# Patient Record
Sex: FEMALE | Race: WHITE | NOT HISPANIC OR LATINO | Employment: OTHER | URBAN - METROPOLITAN AREA
[De-identification: names, ages, dates, MRNs, and addresses within clinical notes are randomized per-mention and may not be internally consistent; named-entity substitution may affect disease eponyms.]

---

## 2023-02-10 PROBLEM — M19.90 DJD (DEGENERATIVE JOINT DISEASE): Status: ACTIVE | Noted: 2023-02-10

## 2023-02-10 PROBLEM — M54.50 LOW BACK PAIN: Status: ACTIVE | Noted: 2023-02-10

## 2023-02-10 PROBLEM — G47.00 INSOMNIA: Status: ACTIVE | Noted: 2023-02-10

## 2023-02-10 PROBLEM — F17.200 NICOTINE DEPENDENCE: Status: ACTIVE | Noted: 2023-02-10

## 2023-02-10 PROBLEM — R93.89 ABNORMAL CT OF THE CHEST: Status: ACTIVE | Noted: 2023-02-10

## 2023-02-10 PROBLEM — M47.816 DJD (DEGENERATIVE JOINT DISEASE), LUMBAR: Status: ACTIVE | Noted: 2023-02-10

## 2023-02-10 PROBLEM — I65.29 CAROTID STENOSIS: Status: ACTIVE | Noted: 2023-02-10

## 2023-02-10 PROBLEM — F41.1 GAD (GENERALIZED ANXIETY DISORDER): Status: ACTIVE | Noted: 2023-02-10

## 2023-02-10 PROBLEM — M99.03 SOMATIC DYSFUNCTION OF LUMBOSACRAL REGION: Status: ACTIVE | Noted: 2023-02-10

## 2023-02-10 PROBLEM — J44.9 COPD (CHRONIC OBSTRUCTIVE PULMONARY DISEASE) (MULTI): Status: ACTIVE | Noted: 2023-02-10

## 2023-02-10 PROBLEM — I70.0 ATHEROSCLEROSIS OF AORTA (CMS-HCC): Status: ACTIVE | Noted: 2023-02-10

## 2023-02-10 PROBLEM — E55.9 VITAMIN D DEFICIENCY: Status: ACTIVE | Noted: 2023-02-10

## 2023-02-10 PROBLEM — R09.89 BRUIT OF RIGHT CAROTID ARTERY: Status: ACTIVE | Noted: 2023-02-10

## 2023-02-10 PROBLEM — M81.0 OSTEOPOROSIS, SENILE: Status: ACTIVE | Noted: 2023-02-10

## 2023-02-10 PROBLEM — E78.5 HYPERLIPIDEMIA: Status: ACTIVE | Noted: 2023-02-10

## 2023-02-10 PROBLEM — J44.1 COPD EXACERBATION (MULTI): Status: ACTIVE | Noted: 2023-02-10

## 2023-02-10 PROBLEM — I10 HYPERTENSION: Status: ACTIVE | Noted: 2023-02-10

## 2023-02-10 PROBLEM — R53.83 FATIGUE: Status: ACTIVE | Noted: 2023-02-10

## 2023-02-10 PROBLEM — J45.909 ASTHMATIC BRONCHITIS (HHS-HCC): Status: ACTIVE | Noted: 2023-02-10

## 2023-02-10 PROBLEM — S39.012A LUMBOSACRAL STRAIN: Status: ACTIVE | Noted: 2023-02-10

## 2023-02-10 RX ORDER — ALPRAZOLAM 0.5 MG/1
0.5 TABLET ORAL 2 TIMES DAILY PRN
COMMUNITY
Start: 2022-10-04 | End: 2023-03-23 | Stop reason: SDUPTHER

## 2023-02-10 RX ORDER — PHENOL 1.4 %
1 AEROSOL, SPRAY (ML) MUCOUS MEMBRANE DAILY
COMMUNITY
Start: 2017-10-23

## 2023-02-10 RX ORDER — ALBUTEROL SULFATE 90 UG/1
AEROSOL, METERED RESPIRATORY (INHALATION) EVERY 4 HOURS PRN
COMMUNITY
End: 2023-03-23 | Stop reason: ALTCHOICE

## 2023-02-10 RX ORDER — ATORVASTATIN CALCIUM 10 MG/1
1 TABLET, FILM COATED ORAL DAILY
COMMUNITY
Start: 2014-04-01 | End: 2023-06-26 | Stop reason: SDUPTHER

## 2023-02-10 RX ORDER — FERROUS SULFATE, DRIED 160(50) MG
2 TABLET, EXTENDED RELEASE ORAL DAILY
COMMUNITY

## 2023-02-10 RX ORDER — VALSARTAN AND HYDROCHLOROTHIAZIDE 160; 12.5 MG/1; MG/1
1 TABLET, FILM COATED ORAL DAILY
COMMUNITY
Start: 2013-12-10 | End: 2023-06-26 | Stop reason: SDUPTHER

## 2023-03-23 ENCOUNTER — OFFICE VISIT (OUTPATIENT)
Dept: PRIMARY CARE | Facility: CLINIC | Age: 73
End: 2023-03-23
Payer: MEDICARE

## 2023-03-23 VITALS
OXYGEN SATURATION: 96 % | DIASTOLIC BLOOD PRESSURE: 79 MMHG | WEIGHT: 119 LBS | SYSTOLIC BLOOD PRESSURE: 143 MMHG | HEIGHT: 62 IN | BODY MASS INDEX: 21.9 KG/M2 | HEART RATE: 80 BPM

## 2023-03-23 DIAGNOSIS — F41.1 GAD (GENERALIZED ANXIETY DISORDER): Primary | ICD-10-CM

## 2023-03-23 DIAGNOSIS — J43.9 PULMONARY EMPHYSEMA, UNSPECIFIED EMPHYSEMA TYPE (MULTI): ICD-10-CM

## 2023-03-23 DIAGNOSIS — J44.1 COPD EXACERBATION (MULTI): ICD-10-CM

## 2023-03-23 DIAGNOSIS — I70.0 ATHEROSCLEROSIS OF AORTA (CMS-HCC): ICD-10-CM

## 2023-03-23 PROCEDURE — 99213 OFFICE O/P EST LOW 20 MIN: CPT | Performed by: FAMILY MEDICINE

## 2023-03-23 PROCEDURE — 3078F DIAST BP <80 MM HG: CPT | Performed by: FAMILY MEDICINE

## 2023-03-23 PROCEDURE — 1159F MED LIST DOCD IN RCRD: CPT | Performed by: FAMILY MEDICINE

## 2023-03-23 PROCEDURE — 3077F SYST BP >= 140 MM HG: CPT | Performed by: FAMILY MEDICINE

## 2023-03-23 RX ORDER — ALPRAZOLAM 0.5 MG/1
0.5 TABLET ORAL 2 TIMES DAILY PRN
Qty: 40 TABLET | Refills: 2 | Status: SHIPPED | OUTPATIENT
Start: 2023-03-23 | End: 2023-06-26 | Stop reason: SDUPTHER

## 2023-03-23 ASSESSMENT — PATIENT HEALTH QUESTIONNAIRE - PHQ9
SUM OF ALL RESPONSES TO PHQ9 QUESTIONS 1 & 2: 0
2. FEELING DOWN, DEPRESSED OR HOPELESS: NOT AT ALL
1. LITTLE INTEREST OR PLEASURE IN DOING THINGS: NOT AT ALL

## 2023-03-23 NOTE — ASSESSMENT & PLAN NOTE
Tobacco use: Patient encouraged to stop smoking.  The patient is aware of the detrimental effects of long-term smoking on overall health and life expectancy.  Call 1800-QUIT-NOW for additional stop smoking counselling free of charge.    Over the past several years, I have counseled the patient about smoking/tobacco cessation and how I can support efforts when patient is ready to quit.  I have discussed nicotine replacement therapy, Chantix, Wellbutrin, hypnosis, support groups and acupuncture as potential options.  Patient currently has no signs or symptoms of tobacco related disease.  If interested in hypnotism, ask for referral to [facility] or call [facility] in [place] at [phone]

## 2023-03-23 NOTE — PROGRESS NOTES
HPI 72 y.o. female presents for evaluation and refill of controlled substance.  She uses Xanax once per day and occasionally twice.  Her last dose was yesterday.  Anxiety is well controlled.  She is sleeping well.  She denies adverse effects.    Continues to smoke cigarettes.  She has diagnosis of COPD and has been counseled on stopping smoking.    Past Medical History:   Diagnosis Date    Basal cell carcinoma of skin, unspecified     Skin cancer, basal cell    Other conditions influencing health status     Normal cardiac stress test    Personal history of diseases of the skin and subcutaneous tissue 09/15/2014    History of rosacea    Personal history of other diseases of the musculoskeletal system and connective tissue     History of osteoarthritis    Personal history of other medical treatment     H/O bone density study    Personal history of other medical treatment     History of mammogram    Personal history of other medical treatment     History of Papanicolaou smear of cervix    Pneumonia, unspecified organism 10/15/2018    Pneumonia, primary atypical      Past Surgical History:   Procedure Laterality Date    APPENDECTOMY  09/15/2014    Appendectomy     SECTION, CLASSIC  09/15/2014     Section    COLONOSCOPY  2016    Complete Colonoscopy    CT HEAD ANGIO W AND WO IV CONTRAST  3/7/2022    CT HEAD ANGIO W AND WO IV CONTRAST 3/7/2022 [facility]    CT NECK ANGIO W AND WO IV CONTRAST  3/7/2022    CT NECK ANGIO W AND WO IV CONTRAST 3/7/2022 [facility]    OTHER SURGICAL HISTORY  09/15/2014    Endarterectomy Common Carotid    OTHER SURGICAL HISTORY  2018    Carotid thromboendarterectomy    OTHER SURGICAL HISTORY  2017    Mohs Micrographic Surgery Nose     Family History   Problem Relation Name Age of Onset    Other (worker's pneumoconiosis) Father          's    Fibromyalgia Sister        Social History     Socioeconomic History    Marital status:  "     Spouse name: Not on file    Number of children: Not on file    Years of education: Not on file    Highest education level: Not on file   Occupational History    Not on file   Tobacco Use    Smoking status: Not on file    Smokeless tobacco: Not on file   Vaping Use    Vaping status: Not on file   Substance and Sexual Activity    Alcohol use: Not on file    Drug use: Not on file    Sexual activity: Not on file   Other Topics Concern    Not on file   Social History Narrative    Not on file     Social Determinants of Health     Financial Resource Strain: Not on file   Food Insecurity: Not on file   Transportation Needs: Not on file   Physical Activity: Not on file   Stress: Not on file   Social Connections: Not on file   Intimate Partner Violence: Not on file   Housing Stability: Not on file       Current Outpatient Medications on File Prior to Visit   Medication Sig Dispense Refill    ALPRAZolam (Xanax) 0.5 mg tablet Take 1 tablet (0.5 mg) by mouth 2 times a day as needed for anxiety.      atorvastatin (Lipitor) 10 mg tablet Take 1 tablet (10 mg) by mouth once daily.      calcium carbonate-vitamin D3 500 mg-5 mcg (200 unit) tablet Take 2 tablets by mouth once daily.      multivitamin-min-iron-FA-vit K (Adults Multivitamin) 18 mg iron-400 mcg-25 mcg tablet Take 1 tablet by mouth 1 (one) time each day.      valsartan-hydrochlorothiazide (Diovan-HCT) 160-12.5 mg tablet Take 1 tablet by mouth once daily.      [DISCONTINUED] albuterol 90 mcg/actuation inhaler Inhale every 4 (four) hours if needed for wheezing (cough).       No current facility-administered medications on file prior to visit.       No Known Allergies    Visit Vitals  /79   Pulse 80   Ht 1.575 m (5' 2\")   Wt 54 kg (119 lb)   SpO2 96%   BMI 21.77 kg/m²   Smoking Status Never Assessed   BSA 1.54 m²        EXAM:  Alert and oriented ×3.  No acute distress.  No tremors noted.  Gait is normal.  Mood and affect are normal. "     Assessment/Diagnosis  1. KB (generalized anxiety disorder)  Patient is aware of Dr. Broderick's and Ashlyn Hazel's practice rules for use of scheduled medication.  Has a signed contract stating that patient will only receive controlled substance prescriptions from Dr. Broderick, will only receive a one month supply, will fill prescriptions at one pharmacy, and agrees to a random urine drug screen.  Patient is aware that she must have an office appointment every 90 days to continue to receive benzodiazepines or narcotics.    - ALPRAZolam (Xanax) 0.5 mg tablet; Take 1 tablet (0.5 mg) by mouth 2 times a day as needed for anxiety.  Dispense: 40 tablet; Refill: 2    4. Pulmonary emphysema, unspecified emphysema type (CMS/[facility])  Counseled on stopping smoking in the past.        Plan    [place] reviewed.  Controlled Substance Agreement is current.  Urine drug screen up to date.    CONTROLLED SUBSTANCE USE:   Patient is aware of Dr. Broderick's and Ashlyn Hazel's practice rules for use of scheduled medication.  Has a signed contract stating that patient will only receive controlled substance prescriptions from Dr. Broderick, will only receive a one month supply, will fill prescriptions at one pharmacy, and agrees to a random urine drug screen.  Patient is aware that she must have an office appointment every 90 days to continue to receive benzodiazepines or narcotics.        Follow up in 3 months for annual comprehensive medical evaluation and Medicare wellness visit    I will continue to monitor, evaluate, assess and treat all problems/diagnoses as appropriate and continue to collaborate with specialists.    Contact office or send a [employer] message with any questions or concerns    Patient will only be notified of labs that require medical intervention.    Prescriptions will not be filled unless you are compliant with your follow up appointments or have a follow up appointment scheduled as per instruction of your physician. Refills  should be requested at the time of your visit.    **Charting was completed using voice recognition technology and may include unintended errors**    Caio Broderick DO, FACOFP  Senior Attending Physician  [facility]  [address], [address]  [place] [address] [phone]

## 2023-06-20 ENCOUNTER — TELEPHONE (OUTPATIENT)
Dept: PRIMARY CARE | Facility: CLINIC | Age: 73
End: 2023-06-20
Payer: COMMERCIAL

## 2023-06-22 ENCOUNTER — LAB (OUTPATIENT)
Dept: LAB | Facility: LAB | Age: 73
End: 2023-06-22
Payer: MEDICARE

## 2023-06-22 DIAGNOSIS — R53.83 FATIGUE, UNSPECIFIED TYPE: ICD-10-CM

## 2023-06-22 DIAGNOSIS — E55.9 VITAMIN D DEFICIENCY: ICD-10-CM

## 2023-06-22 DIAGNOSIS — E78.5 HYPERLIPIDEMIA, UNSPECIFIED HYPERLIPIDEMIA TYPE: ICD-10-CM

## 2023-06-22 DIAGNOSIS — I10 HYPERTENSION, UNSPECIFIED TYPE: ICD-10-CM

## 2023-06-22 DIAGNOSIS — Z00.00 ENCOUNTER FOR HEALTH MAINTENANCE EXAMINATION: ICD-10-CM

## 2023-06-22 DIAGNOSIS — Z51.81 ENCOUNTER FOR THERAPEUTIC DRUG LEVEL MONITORING: ICD-10-CM

## 2023-06-22 LAB
ALANINE AMINOTRANSFERASE (SGPT) (U/L) IN SER/PLAS: 13 U/L (ref 7–45)
ALBUMIN (G/DL) IN SER/PLAS: 4.1 G/DL (ref 3.4–5)
ALKALINE PHOSPHATASE (U/L) IN SER/PLAS: 61 U/L (ref 33–136)
ANION GAP IN SER/PLAS: 13 MMOL/L (ref 10–20)
APPEARANCE, URINE: ABNORMAL
ASPARTATE AMINOTRANSFERASE (SGOT) (U/L) IN SER/PLAS: 27 U/L (ref 9–39)
BILIRUBIN TOTAL (MG/DL) IN SER/PLAS: 0.8 MG/DL (ref 0–1.2)
BILIRUBIN, URINE: NEGATIVE
BLOOD, URINE: NEGATIVE
CALCIUM (MG/DL) IN SER/PLAS: 9.5 MG/DL (ref 8.6–10.3)
CARBON DIOXIDE, TOTAL (MMOL/L) IN SER/PLAS: 25 MMOL/L (ref 21–32)
CHLORIDE (MMOL/L) IN SER/PLAS: 100 MMOL/L (ref 98–107)
CHOLESTEROL (MG/DL) IN SER/PLAS: 135 MG/DL (ref 0–199)
CHOLESTEROL IN HDL (MG/DL) IN SER/PLAS: 34.8 MG/DL
CHOLESTEROL/HDL RATIO: 3.9
COLOR, URINE: YELLOW
CREATININE (MG/DL) IN SER/PLAS: 0.6 MG/DL (ref 0.5–1.05)
ERYTHROCYTE DISTRIBUTION WIDTH (RATIO) BY AUTOMATED COUNT: 11.9 % (ref 11.5–14.5)
ERYTHROCYTE MEAN CORPUSCULAR HEMOGLOBIN CONCENTRATION (G/DL) BY AUTOMATED: 33.7 G/DL (ref 32–36)
ERYTHROCYTE MEAN CORPUSCULAR VOLUME (FL) BY AUTOMATED COUNT: 112 FL (ref 80–100)
ERYTHROCYTES (10*6/UL) IN BLOOD BY AUTOMATED COUNT: 3.96 X10E12/L (ref 4–5.2)
GFR FEMALE: >90 ML/MIN/1.73M2
GLUCOSE (MG/DL) IN SER/PLAS: 92 MG/DL (ref 74–99)
GLUCOSE, URINE: NEGATIVE MG/DL
HEMATOCRIT (%) IN BLOOD BY AUTOMATED COUNT: 44.5 % (ref 36–46)
HEMOGLOBIN (G/DL) IN BLOOD: 15 G/DL (ref 12–16)
KETONES, URINE: NEGATIVE MG/DL
LDL: 82 MG/DL (ref 0–99)
LEUKOCYTE ESTERASE, URINE: NEGATIVE
LEUKOCYTES (10*3/UL) IN BLOOD BY AUTOMATED COUNT: 7.3 X10E9/L (ref 4.4–11.3)
NITRITE, URINE: NEGATIVE
PH, URINE: 7 (ref 5–8)
PLATELETS (10*3/UL) IN BLOOD AUTOMATED COUNT: 146 X10E9/L (ref 150–450)
POTASSIUM (MMOL/L) IN SER/PLAS: 3.7 MMOL/L (ref 3.5–5.3)
PROTEIN TOTAL: 6.8 G/DL (ref 6.4–8.2)
PROTEIN, URINE: NEGATIVE MG/DL
RBC MORPHOLOGY IN BLOOD: NORMAL
SODIUM (MMOL/L) IN SER/PLAS: 134 MMOL/L (ref 136–145)
SPECIFIC GRAVITY, URINE: 1.01 (ref 1–1.03)
SPHEROCYTES PRESENCE IN BLOOD BY LIGHT MICROSCOPY: NORMAL
THYROTROPIN (MIU/L) IN SER/PLAS BY DETECTION LIMIT <= 0.05 MIU/L: 0.57 MIU/L (ref 0.44–3.98)
TRIGLYCERIDE (MG/DL) IN SER/PLAS: 93 MG/DL (ref 0–149)
UREA NITROGEN (MG/DL) IN SER/PLAS: 19 MG/DL (ref 6–23)
UROBILINOGEN, URINE: 2 MG/DL (ref 0–1.9)
VLDL: 19 MG/DL (ref 0–40)

## 2023-06-22 PROCEDURE — 36415 COLL VENOUS BLD VENIPUNCTURE: CPT

## 2023-06-22 PROCEDURE — 84443 ASSAY THYROID STIM HORMONE: CPT

## 2023-06-22 PROCEDURE — 82652 VIT D 1 25-DIHYDROXY: CPT

## 2023-06-22 PROCEDURE — 80358 DRUG SCREENING METHADONE: CPT

## 2023-06-22 PROCEDURE — 80053 COMPREHEN METABOLIC PANEL: CPT

## 2023-06-22 PROCEDURE — 80361 OPIATES 1 OR MORE: CPT

## 2023-06-22 PROCEDURE — 80368 SEDATIVE HYPNOTICS: CPT

## 2023-06-22 PROCEDURE — 80373 DRUG SCREENING TRAMADOL: CPT

## 2023-06-22 PROCEDURE — 80307 DRUG TEST PRSMV CHEM ANLYZR: CPT

## 2023-06-22 PROCEDURE — 85027 COMPLETE CBC AUTOMATED: CPT

## 2023-06-22 PROCEDURE — 80365 DRUG SCREENING OXYCODONE: CPT

## 2023-06-22 PROCEDURE — 80061 LIPID PANEL: CPT

## 2023-06-22 PROCEDURE — 85060 BLOOD SMEAR INTERPRETATION: CPT | Performed by: PATHOLOGY

## 2023-06-22 PROCEDURE — 81003 URINALYSIS AUTO W/O SCOPE: CPT

## 2023-06-22 PROCEDURE — 80354 DRUG SCREENING FENTANYL: CPT

## 2023-06-22 PROCEDURE — 80346 BENZODIAZEPINES1-12: CPT

## 2023-06-23 LAB — CBC DIFFERENTIAL PATH REVIEW: NORMAL

## 2023-06-25 PROBLEM — L57.0 ACTINIC KERATOSES: Status: ACTIVE | Noted: 2023-06-25

## 2023-06-25 PROBLEM — C44.621 SQUAMOUS CELL CARCINOMA, ARM: Status: ACTIVE | Noted: 2023-06-25

## 2023-06-25 PROBLEM — L72.0 MILIUM: Status: ACTIVE | Noted: 2023-06-25

## 2023-06-25 PROBLEM — L71.9 ROSACEA: Status: ACTIVE | Noted: 2023-06-25

## 2023-06-25 PROBLEM — L82.1 SEBORRHEIC KERATOSES: Status: ACTIVE | Noted: 2023-06-25

## 2023-06-25 PROBLEM — D18.01 CHERRY HEMANGIOMA: Status: ACTIVE | Noted: 2023-06-25

## 2023-06-25 PROBLEM — C44.321 SQUAMOUS CELL CARCINOMA OF NOSE: Status: ACTIVE | Noted: 2023-06-25

## 2023-06-25 PROBLEM — L85.9: Status: ACTIVE | Noted: 2023-06-25

## 2023-06-25 PROBLEM — L81.4 SOLAR LENTIGO: Status: ACTIVE | Noted: 2023-06-25

## 2023-06-25 RX ORDER — ALBUTEROL SULFATE 90 UG/1
2 AEROSOL, METERED RESPIRATORY (INHALATION) EVERY 4 HOURS PRN
COMMUNITY
Start: 2022-12-28 | End: 2023-06-26 | Stop reason: ALTCHOICE

## 2023-06-26 ENCOUNTER — OFFICE VISIT (OUTPATIENT)
Dept: PRIMARY CARE | Facility: CLINIC | Age: 73
End: 2023-06-26
Payer: MEDICARE

## 2023-06-26 VITALS
WEIGHT: 114 LBS | HEART RATE: 81 BPM | DIASTOLIC BLOOD PRESSURE: 64 MMHG | SYSTOLIC BLOOD PRESSURE: 128 MMHG | BODY MASS INDEX: 20.2 KG/M2 | HEIGHT: 63 IN | OXYGEN SATURATION: 95 %

## 2023-06-26 DIAGNOSIS — F17.218 CIGARETTE NICOTINE DEPENDENCE WITH OTHER NICOTINE-INDUCED DISORDER: ICD-10-CM

## 2023-06-26 DIAGNOSIS — Z51.81 ENCOUNTER FOR THERAPEUTIC DRUG LEVEL MONITORING: ICD-10-CM

## 2023-06-26 DIAGNOSIS — F41.1 GAD (GENERALIZED ANXIETY DISORDER): ICD-10-CM

## 2023-06-26 DIAGNOSIS — I10 HYPERTENSION, UNSPECIFIED TYPE: ICD-10-CM

## 2023-06-26 DIAGNOSIS — E78.5 HYPERLIPIDEMIA, UNSPECIFIED HYPERLIPIDEMIA TYPE: ICD-10-CM

## 2023-06-26 DIAGNOSIS — M19.90 OSTEOARTHRITIS, UNSPECIFIED OSTEOARTHRITIS TYPE, UNSPECIFIED SITE: ICD-10-CM

## 2023-06-26 DIAGNOSIS — I70.0 ATHEROSCLEROSIS OF AORTA (CMS-HCC): ICD-10-CM

## 2023-06-26 DIAGNOSIS — Z00.00 MEDICARE ANNUAL WELLNESS VISIT, SUBSEQUENT: ICD-10-CM

## 2023-06-26 DIAGNOSIS — J43.9 PULMONARY EMPHYSEMA, UNSPECIFIED EMPHYSEMA TYPE (MULTI): ICD-10-CM

## 2023-06-26 DIAGNOSIS — E55.9 VITAMIN D DEFICIENCY: ICD-10-CM

## 2023-06-26 DIAGNOSIS — Z00.00 ENCOUNTER FOR HEALTH MAINTENANCE EXAMINATION: Primary | ICD-10-CM

## 2023-06-26 DIAGNOSIS — R53.83 FATIGUE, UNSPECIFIED TYPE: ICD-10-CM

## 2023-06-26 DIAGNOSIS — M81.0 AGE-RELATED OSTEOPOROSIS WITHOUT CURRENT PATHOLOGICAL FRACTURE: ICD-10-CM

## 2023-06-26 PROBLEM — C44.621 SQUAMOUS CELL CARCINOMA, ARM: Status: RESOLVED | Noted: 2023-06-25 | Resolved: 2023-06-26

## 2023-06-26 PROBLEM — L57.0 ACTINIC KERATOSES: Status: RESOLVED | Noted: 2023-06-25 | Resolved: 2023-06-26

## 2023-06-26 LAB — VITAMIN D 1,25-DIHYDROXY: 69.1 PG/ML (ref 19.9–79.3)

## 2023-06-26 PROCEDURE — G0446 INTENS BEHAVE THER CARDIO DX: HCPCS | Performed by: FAMILY MEDICINE

## 2023-06-26 PROCEDURE — 1159F MED LIST DOCD IN RCRD: CPT | Performed by: FAMILY MEDICINE

## 2023-06-26 PROCEDURE — 99497 ADVNCD CARE PLAN 30 MIN: CPT | Performed by: FAMILY MEDICINE

## 2023-06-26 PROCEDURE — 3078F DIAST BP <80 MM HG: CPT | Performed by: FAMILY MEDICINE

## 2023-06-26 PROCEDURE — 1170F FXNL STATUS ASSESSED: CPT | Performed by: FAMILY MEDICINE

## 2023-06-26 PROCEDURE — 1160F RVW MEDS BY RX/DR IN RCRD: CPT | Performed by: FAMILY MEDICINE

## 2023-06-26 PROCEDURE — 99397 PER PM REEVAL EST PAT 65+ YR: CPT | Performed by: FAMILY MEDICINE

## 2023-06-26 PROCEDURE — 93000 ELECTROCARDIOGRAM COMPLETE: CPT | Performed by: FAMILY MEDICINE

## 2023-06-26 PROCEDURE — 3074F SYST BP LT 130 MM HG: CPT | Performed by: FAMILY MEDICINE

## 2023-06-26 PROCEDURE — G0439 PPPS, SUBSEQ VISIT: HCPCS | Performed by: FAMILY MEDICINE

## 2023-06-26 RX ORDER — VALSARTAN AND HYDROCHLOROTHIAZIDE 160; 12.5 MG/1; MG/1
1 TABLET, FILM COATED ORAL DAILY
Qty: 90 TABLET | Refills: 2 | Status: SHIPPED | OUTPATIENT
Start: 2023-06-26 | End: 2023-12-18 | Stop reason: SDUPTHER

## 2023-06-26 RX ORDER — ALPRAZOLAM 0.5 MG/1
0.5 TABLET ORAL 2 TIMES DAILY PRN
Qty: 40 TABLET | Refills: 2 | Status: SHIPPED | OUTPATIENT
Start: 2023-06-26 | End: 2023-10-06 | Stop reason: SDUPTHER

## 2023-06-26 RX ORDER — ATORVASTATIN CALCIUM 10 MG/1
10 TABLET, FILM COATED ORAL DAILY
Qty: 90 TABLET | Refills: 2 | Status: SHIPPED | OUTPATIENT
Start: 2023-06-26 | End: 2023-12-18 | Stop reason: SDUPTHER

## 2023-06-26 ASSESSMENT — ACTIVITIES OF DAILY LIVING (ADL)
BATHING: INDEPENDENT
DOING_HOUSEWORK: INDEPENDENT
DRESSING: INDEPENDENT
MANAGING_FINANCES: INDEPENDENT
GROCERY_SHOPPING: INDEPENDENT
TAKING_MEDICATION: INDEPENDENT

## 2023-06-26 NOTE — PATIENT INSTRUCTIONS
Handrails and grab bars  Daily walk for 30 mins every day    Multiple vitamin/mineral WITH iron once daily

## 2023-06-26 NOTE — PROGRESS NOTES
Annual Comprehensive Medical Exam    73 y.o. female presents for annual comprehensive medical evaluation, annual Medicare wellness visit and preventive services screening.  No recent hospitalizations, surgeries or significant injuries.    HPI    Right carotid bruit eval'd by Dr. Easton, [facility], with cartotid duplex in 23.  Follow up in 2023    Anxiety is managed with once daily Xanax.  Her last dose was last night.  Rarely she requires a second dose during the day.  40 tablets last 30+ days.    Compliant with Diovan HCTZ and Lipitor.  Also takes multiple vitamin with mineral and the calcium/vitamin D supplement.  She is active in the yard but does not exercise.    Bilateral mammogram in 2023.  Lung cancer screening with low-dose CT lung screen in 2023  Colonoscopy in   Osteoporosis evaluation with DEXA scan in  shows advanced osteopenia.  Immunizations are up-to-date for pneumococcal, shingles, COVID and influenza.    Past Medical History:   Diagnosis Date    Basal cell carcinoma of skin, unspecified     Skin cancer, basal cell    Other conditions influencing health status     Normal cardiac stress test    Personal history of diseases of the skin and subcutaneous tissue 09/15/2014    History of rosacea    Personal history of other diseases of the musculoskeletal system and connective tissue     History of osteoarthritis    Personal history of other medical treatment     H/O bone density study    Personal history of other medical treatment     History of mammogram    Personal history of other medical treatment     History of Papanicolaou smear of cervix    Pneumonia, unspecified organism 10/15/2018    Pneumonia, primary atypical      Past Surgical History:   Procedure Laterality Date    APPENDECTOMY  09/15/2014    Appendectomy     SECTION, CLASSIC  09/15/2014     Section    COLONOSCOPY  2016    Complete Colonoscopy    CT HEAD ANGIO W AND WO IV CONTRAST   3/7/2022    CT HEAD ANGIO W AND WO IV CONTRAST 3/7/2022 [facility]    CT NECK ANGIO W AND WO IV CONTRAST  3/7/2022    CT NECK ANGIO W AND WO IV CONTRAST 3/7/2022 [facility]    OTHER SURGICAL HISTORY  09/15/2014    Endarterectomy Common Carotid    OTHER SURGICAL HISTORY  11/08/2018    Carotid thromboendarterectomy    OTHER SURGICAL HISTORY  04/17/2017    Mohs Micrographic Surgery Nose     Family History   Problem Relation Name Age of Onset    Other (worker's pneumoconiosis) Father          's    Fibromyalgia Sister        Social History     Socioeconomic History    Marital status:      Spouse name: Not on file    Number of children: Not on file    Years of education: Not on file    Highest education level: Not on file   Occupational History    Not on file   Tobacco Use    Smoking status: Every Day     Packs/day: 1.00     Years: 55.00     Total pack years: 55.00     Types: Cigarettes    Smokeless tobacco: Never   Substance and Sexual Activity    Alcohol use: Not Currently    Drug use: Never    Sexual activity: Not on file   Other Topics Concern    Not on file   Social History Narrative    Not on file     Social Determinants of Health     Financial Resource Strain: Not on file   Food Insecurity: Not on file   Transportation Needs: Not on file   Physical Activity: Not on file   Stress: Not on file   Social Connections: Not on file   Intimate Partner Violence: Not on file   Housing Stability: Not on file       Current Outpatient Medications on File Prior to Visit   Medication Sig Dispense Refill    ALPRAZolam (Xanax) 0.5 mg tablet Take 1 tablet (0.5 mg) by mouth 2 times a day as needed for anxiety. 40 tablet 2    atorvastatin (Lipitor) 10 mg tablet Take 1 tablet (10 mg) by mouth once daily.      calcium carbonate-vitamin D3 500 mg-5 mcg (200 unit) tablet Take 2 tablets by mouth once daily.      multivitamin-min-iron-FA-vit K (Adults Multivitamin) 18 mg iron-400 mcg-25 mcg tablet Take  "1 tablet by mouth 1 (one) time each day.      valsartan-hydrochlorothiazide (Diovan-HCT) 160-12.5 mg tablet Take 1 tablet by mouth once daily.      [DISCONTINUED] albuterol 90 mcg/actuation inhaler Inhale 2 puffs every 4 hours if needed for shortness of breath or wheezing.       No current facility-administered medications on file prior to visit.       No Known Allergies    Complete review of systems is negative today except for that mentioned in the history of present illness.  In particular patient denies chest pain, shortness of breath, headaches and GI disturbances.      Visit Vitals  /64   Pulse 81   Ht 1.588 m (5' 2.5\")   Wt 51.7 kg (114 lb)   SpO2 95%   BMI 20.52 kg/m²   Smoking Status Every Day   BSA 1.51 m²      Physical Exam  Gen.: Alert and oriented ×3 female in no acute distress.  HEENT: Head is normocephalic.  Pupils equal and reactive to light.  Tympanic membranes are clear.  Pharynx is clear.  Neck is supple without adenopathy.  Significant right carotid bruit.  No masses or thyromegaly  Heart: Regular rate and rhythm without murmurs.  Lungs: Clear to auscultation bilaterally.  Breasts: deferred to GYN at pt request.  Pelvic: Deferred to GYN at pt request.  Abdomen: Soft with normal bowel sounds.  No masses or pain to palpation.  No bruits auscultated.  Extremities: Good range of motion of all joints.  No significant edema. Pedal pulses +1/4  Skin: No significant or irregular nevi visualized.  Neuro: No signs of focal neurologic deficit.  No tremor.  Speech and hearing are normal.  DTRs +3/4;  Muscle Strength +5/5.  Musculoskeletal: Spine with good ROM.  No scoliosis.  Leg lengths are equal.  Psych: normal affect.  No suicidal ideation.  Good judgement and insight.     The 10-year ASCVD risk score (Juani DK, et al., 2019) is: 28.3%    Values used to calculate the score:      Age: 73 years      Sex: Female      Is Non- : No      Diabetic: No      Tobacco smoker: Yes      " Systolic Blood Pressure: 128 mmHg      Is BP treated: Yes      HDL Cholesterol: 34.8 mg/dL      Total Cholesterol: 135 mg/dL      Lab reviewed in detail with patient      Diagnosis/Plan    1. Encounter for health maintenance examination  - Comprehensive Metabolic Panel; Future  - CBC; Future  - Lipid Panel; Future  - TSH with reflex to Free T4 if abnormal; Future  - Vitamin D 1,25 Dihydroxy; Future  - Urinalysis with Reflex Microscopic; Future  - ECG 12 lead (Clinic Performed)    2. Medicare annual wellness visit, subsequent  Living Will / Advanced Care Planning:  Discussed advance care planning including explanation and discussion of advanced directives.  Patient does have current up-to-date documents.       3. Hyperlipidemia, unspecified hyperlipidemia type  Hyperlipidemia:  Patient to continue medication.  Emphasize diet and exercise.  Explained importance of risk factor modification.  Emphasized importance of compliance to decrease risk for heart attack, stroke and peripheral artery disease.  - Lipid Panel; Future  - atorvastatin (Lipitor) 10 mg tablet; Take 1 tablet (10 mg) by mouth once daily.  Dispense: 90 tablet; Refill: 2    4. Hypertension, unspecified type  Hypertension: Discussed importance of good blood pressure control to avoid long-term complications such as heart attack and stroke.  Patient is aware that blood pressure goal is less than 130/80.  Maintaining a regular exercise program and body mass index (BMI) less than 25 as well as a diet lower in carbohydrates will help reach these goals.  - Comprehensive Metabolic Panel; Future  - ECG 12 lead (Clinic Performed)  - valsartan-hydrochlorothiazide (Diovan-HCT) 160-12.5 mg tablet; Take 1 tablet by mouth once daily.  Dispense: 90 tablet; Refill: 2    5. Vitamin D deficiency  - Vitamin D 1,25 Dihydroxy; Future    6. Age-related osteoporosis without current pathological fracture  - XR DEXA bone density; Future    7. Pulmonary emphysema, unspecified  emphysema type (CMS/HCC)  Remains essentially asymptomatic.  Encourage patient to begin a walking program for heart and lung benefit.    8. Atherosclerosis of aorta (CMS/HCC)  Follow-up with vascular surgeon in September 9. Osteoarthritis, unspecified osteoarthritis type, unspecified site    10. KB (generalized anxiety disorder)  Patient is aware of Dr. Broderick's and Ashlyn Hazel's practice rules for use of scheduled medication.  Has a signed contract stating that patient will only receive controlled substance prescriptions from Dr. Broderick, will only receive a one month supply, will fill prescriptions at one pharmacy, and agrees to a random urine drug screen.  Patient is aware that she must have an office appointment every 90 days to continue to receive benzodiazepines or narcotics.  - ALPRAZolam (Xanax) 0.5 mg tablet; Take 1 tablet (0.5 mg) by mouth 2 times a day as needed for anxiety.  Dispense: 40 tablet; Refill: 2    11. Cigarette nicotine dependence with other nicotine-induced disorder  Tobacco use: Patient encouraged to stop smoking.  The patient is aware of the detrimental effects of long-term smoking on overall health and life expectancy.  Call 6-800QUIT-NOW for additional stop smoking counselling free of charge.    Over the past several years, I have counseled the patient about smoking/tobacco cessation and how I can support efforts when patient is ready to quit.  I have discussed nicotine replacement therapy, Chantix, Wellbutrin, hypnosis, support groups and acupuncture as potential options.  Patient currently has no signs or symptoms of tobacco related disease.  If interested in hypnotism, ask for referral to [facility] or call [facility] in [place] at [phone]    12. Encounter for therapeutic drug level monitoring  - Opiate/Opioid/Benzo Extended Prescription Compliance; Future        Follow up in 6 months for medical management    I will continue to monitor,  evaluate, assess and treat all problems/diagnoses as appropriate and continue to collaborate with specialists.    Contact office or send a  MY Chart message with any questions or concerns    Encouraged to sign up with my  My Chart  Patient will only be notified of labs that require medical intervention.    Prescriptions will not be filled unless you are compliant with your follow up appointments or have a follow up appointment scheduled as per instruction of your physician. Refills should be requested at the time of your visit.    **Charting was completed using voice recognition technology and may include unintended errors**    Caio Broderick DO, FACOFP  [address]  [place] [address] [phone]

## 2023-06-28 LAB
6-ACETYLMORPHINE: <25 NG/ML
7-AMINOCLONAZEPAM: <25 NG/ML
ALPHA-HYDROXYALPRAZOLAM: 116 NG/ML
ALPHA-HYDROXYMIDAZOLAM: <25 NG/ML
ALPRAZOLAM: 46 NG/ML
AMPHETAMINE (PRESENCE) IN URINE BY SCREEN METHOD: ABNORMAL
BARBITURATES PRESENCE IN URINE BY SCREEN METHOD: ABNORMAL
CANNABINOIDS IN URINE BY SCREEN METHOD: ABNORMAL
CHLORDIAZEPOXIDE: <25 NG/ML
CLONAZEPAM: <25 NG/ML
COCAINE (PRESENCE) IN URINE BY SCREEN METHOD: ABNORMAL
CODEINE: <50 NG/ML
CREATINE, URINE FOR DRUG: 67.8 MG/DL
DIAZEPAM: <25 NG/ML
DRUG SCREEN COMMENT URINE: ABNORMAL
EDDP: <25 NG/ML
FENTANYL CONFIRMATION, URINE: <2.5 NG/ML
HYDROCODONE: <25 NG/ML
HYDROMORPHONE: <25 NG/ML
LORAZEPAM: <25 NG/ML
METHADONE CONFIRMATION,URINE: <25 NG/ML
MIDAZOLAM: <25 NG/ML
MORPHINE URINE: <50 NG/ML
NORDIAZEPAM: <25 NG/ML
NORFENTANYL: <2.5 NG/ML
NORHYDROCODONE: <25 NG/ML
NOROXYCODONE: <25 NG/ML
O-DESMETHYLTRAMADOL: <50 NG/ML
OXAZEPAM: <25 NG/ML
OXYCODONE: <25 NG/ML
OXYMORPHONE: <25 NG/ML
PHENCYCLIDINE (PRESENCE) IN URINE BY SCREEN METHOD: ABNORMAL
TEMAZEPAM: <25 NG/ML
TRAMADOL: <50 NG/ML
ZOLPIDEM METABOLITE (ZCA): <25 NG/ML
ZOLPIDEM: <25 NG/ML

## 2023-06-29 ENCOUNTER — TELEPHONE (OUTPATIENT)
Dept: PRIMARY CARE | Facility: CLINIC | Age: 73
End: 2023-06-29
Payer: COMMERCIAL

## 2023-07-07 DIAGNOSIS — M81.0 OSTEOPOROSIS, SENILE: Primary | ICD-10-CM

## 2023-07-07 RX ORDER — ALENDRONATE SODIUM 70 MG/1
70 TABLET ORAL
Qty: 12 TABLET | Refills: 2 | Status: SHIPPED | OUTPATIENT
Start: 2023-07-07 | End: 2023-12-18 | Stop reason: SDUPTHER

## 2023-09-25 ENCOUNTER — APPOINTMENT (OUTPATIENT)
Dept: PRIMARY CARE | Facility: CLINIC | Age: 73
End: 2023-09-25
Payer: COMMERCIAL

## 2023-10-06 ENCOUNTER — OFFICE VISIT (OUTPATIENT)
Dept: PRIMARY CARE | Facility: CLINIC | Age: 73
End: 2023-10-06
Payer: MEDICARE

## 2023-10-06 VITALS
WEIGHT: 111 LBS | SYSTOLIC BLOOD PRESSURE: 150 MMHG | HEIGHT: 63 IN | DIASTOLIC BLOOD PRESSURE: 67 MMHG | BODY MASS INDEX: 19.67 KG/M2

## 2023-10-06 DIAGNOSIS — R63.4 WEIGHT LOSS, UNINTENTIONAL: ICD-10-CM

## 2023-10-06 DIAGNOSIS — I70.90 ASVD (ARTERIOSCLEROTIC VASCULAR DISEASE): Primary | ICD-10-CM

## 2023-10-06 DIAGNOSIS — F41.1 GAD (GENERALIZED ANXIETY DISORDER): ICD-10-CM

## 2023-10-06 PROCEDURE — 1160F RVW MEDS BY RX/DR IN RCRD: CPT | Performed by: FAMILY MEDICINE

## 2023-10-06 PROCEDURE — 3077F SYST BP >= 140 MM HG: CPT | Performed by: FAMILY MEDICINE

## 2023-10-06 PROCEDURE — 99214 OFFICE O/P EST MOD 30 MIN: CPT | Performed by: FAMILY MEDICINE

## 2023-10-06 PROCEDURE — 1126F AMNT PAIN NOTED NONE PRSNT: CPT | Performed by: FAMILY MEDICINE

## 2023-10-06 PROCEDURE — 3078F DIAST BP <80 MM HG: CPT | Performed by: FAMILY MEDICINE

## 2023-10-06 PROCEDURE — 1159F MED LIST DOCD IN RCRD: CPT | Performed by: FAMILY MEDICINE

## 2023-10-06 RX ORDER — ALPRAZOLAM 0.5 MG/1
0.5 TABLET ORAL 2 TIMES DAILY PRN
Qty: 40 TABLET | Refills: 2 | Status: SHIPPED | OUTPATIENT
Start: 2023-10-06 | End: 2023-12-18 | Stop reason: SDUPTHER

## 2023-10-06 RX ORDER — ASPIRIN 81 MG/1
81 TABLET ORAL DAILY
Qty: 30 TABLET | Refills: 11 | Status: SHIPPED | OUTPATIENT
Start: 2023-10-06 | End: 2024-10-05

## 2023-10-06 RX ORDER — PAROXETINE 10 MG/1
10 TABLET, FILM COATED ORAL EVERY MORNING
Qty: 30 TABLET | Refills: 1 | Status: SHIPPED | OUTPATIENT
Start: 2023-10-06 | End: 2023-11-27 | Stop reason: SINTOL

## 2023-10-06 NOTE — PATIENT INSTRUCTIONS
Start Paxil (paroxetine) to help with appetite (and nervousness).   - call me in 1 month if appetite not improving  Continue Xanax if needed at bedtime

## 2023-10-06 NOTE — PROGRESS NOTES
HPI 73 y.o. female presents for evaluation and refill of controlled substance.      KB - xanax nightly.  Continues to lose weight due to decreased appetite.  She states family is becoming upset/concerned.  Patient states she feels fine.  She tries to consume protein with meals.   - Has had bilateral mammogram and low-dose CT scan this year.  No evidence of cancer.  Patient denies changes in bowel movement such as presence of blood or ribbonlike stool caliber.    ASVD - vascular surgeon recommended resuming ASA 81mg daily.  Still smoking.    Had cataract surgery in past 3 months and a root canal    Past Medical History:   Diagnosis Date    Actinic keratoses 2023    Formatting of this note might be different from the original. UPPER CHEST LEFT OF MIDLINE, RIGHT MEDIAL CHEEK    Basal cell carcinoma of skin, unspecified     Skin cancer, basal cell    Other conditions influencing health status     Normal cardiac stress test    Personal history of diseases of the skin and subcutaneous tissue 09/15/2014    History of rosacea    Personal history of other diseases of the musculoskeletal system and connective tissue     History of osteoarthritis    Personal history of other medical treatment     H/O bone density study    Personal history of other medical treatment     History of mammogram    Personal history of other medical treatment     History of Papanicolaou smear of cervix    Pneumonia, unspecified organism 10/15/2018    Pneumonia, primary atypical    Squamous cell carcinoma, arm 2023    Formatting of this note might be different from the original. LEFT UPPER ARM      Past Surgical History:   Procedure Laterality Date    APPENDECTOMY  09/15/2014    Appendectomy     SECTION, CLASSIC  09/15/2014     Section    COLONOSCOPY  2016    Complete Colonoscopy    CT HEAD ANGIO W AND WO IV CONTRAST  3/7/2022    CT HEAD ANGIO W AND WO IV CONTRAST 3/7/2022 [facility]CILL[facility] LEGAC[facility]    CT NECK ANGIO W AND  WO IV CONTRAST  3/7/2022    CT NECK ANGIO W AND WO IV CONTRAST 3/7/2022 [facility] ANCILLARY LEGACY    OTHER SURGICAL HISTORY  09/15/2014    Endarterectomy Common Carotid    OTHER SURGICAL HISTORY  11/08/2018    Carotid thromboendarterectomy    OTHER SURGICAL HISTORY  04/17/2017    Mohs Micrographic Surgery Nose     Family History   Problem Relation Name Age of Onset    Other (worker's pneumoconiosis) Father          's    Fibromyalgia Sister        Social History     Socioeconomic History    Marital status:      Spouse name: Not on file    Number of children: Not on file    Years of education: Not on file    Highest education level: Not on file   Occupational History    Not on file   Tobacco Use    Smoking status: Every Day     Packs/day: 1.00     Years: 55.00     Additional pack years: 0.00     Total pack years: 55.00     Types: Cigarettes    Smokeless tobacco: Never   Substance and Sexual Activity    Alcohol use: Not Currently    Drug use: Never    Sexual activity: Not on file   Other Topics Concern    Not on file   Social History Narrative    Not on file     Social Determinants of Health     Financial Resource Strain: Not on file   Food Insecurity: Not on file   Transportation Needs: Not on file   Physical Activity: Not on file   Stress: Not on file   Social Connections: Not on file   Intimate Partner Violence: Not on file   Housing Stability: Not on file       Current Outpatient Medications on File Prior to Visit   Medication Sig Dispense Refill    alendronate (Fosamax) 70 mg tablet Take 1 tablet (70 mg) by mouth every 7 days. Take in the morning with a full glass of water, on an empty stomach, and do not take anything else by mouth or lie down for the next 30 min. 12 tablet 2    ALPRAZolam (Xanax) 0.5 mg tablet Take 1 tablet (0.5 mg) by mouth 2 times a day as needed for anxiety. 40 tablet 2    atorvastatin (Lipitor) 10 mg tablet Take 1 tablet (10 mg) by mouth once daily. 90 tablet 2    calcium  "carbonate-vitamin D3 500 mg-5 mcg (200 unit) tablet Take 2 tablets by mouth once daily.      multivitamin-min-iron-FA-vit K (Adults Multivitamin) 18 mg iron-400 mcg-25 mcg tablet Take 1 tablet by mouth 1 (one) time each day.      valsartan-hydrochlorothiazide (Diovan-HCT) 160-12.5 mg tablet Take 1 tablet by mouth once daily. 90 tablet 2     No current facility-administered medications on file prior to visit.       No Known Allergies    Visit Vitals  /67   Ht 1.588 m (5' 2.5\")   Wt 50.3 kg (111 lb)   BMI 19.98 kg/m²   Smoking Status Every Day   BSA 1.49 m²        EXAM:  Alert and oriented ×3.  No acute distress.  No tremors noted.  Gait is normal.  Mood and affect are normal.     Assessment/Diagnosis  1. ASVD (arteriosclerotic vascular disease)  - aspirin 81 mg EC tablet; Take 1 tablet (81 mg) by mouth once daily.  Dispense: 30 tablet; Refill: 11    2. KB (generalized anxiety disorder)  - ALPRAZolam (Xanax) 0.5 mg tablet; Take 1 tablet (0.5 mg) by mouth 2 times a day as needed for anxiety.  Dispense: 40 tablet; Refill: 2  - PARoxetine (Paxil) 10 mg tablet; Take 1 tablet (10 mg) by mouth once daily in the morning.  Dispense: 30 tablet; Refill: 1    3. Weight loss, unintentional  We will start Paxil for more aggressive treatment of anxiety and possible weight gain.  Patient will notify me in 1 month if there has not been an increase in appetite.  Consider increasing dose to 20 mg daily.  We will have to consider malignancy work-up if Paxil is not effective.  Discussed possible use of Remeron and Periactin for appetite stimulation.        Follow up in 3 months for medical management    I will continue to monitor, evaluate, assess and treat all problems/diagnoses as appropriate and continue to collaborate with specialists.    Contact office or send a  MY Chart message with any questions or concerns    Encouraged to sign up with my  My Chart  Patient will only be notified of labs that require medical " intervention.    Prescriptions will not be filled unless you are compliant with your follow up appointments or have a follow up appointment scheduled as per instruction of your physician. Refills should be requested at the time of your visit.    **Charting was completed using voice recognition technology and may include unintended errors**    Caio Broderick DO, FACOFP  [address]  [place] [address] [phone]        Plan    [place] reviewed.  Controlled Substance Agreement is current.  Urine drug screen up to date.    CONTROLLED SUBSTANCE USE:   Patient is aware of Dr. Broderick's and Ashlyn Hazel's practice rules for use of scheduled medication.  Has a signed contract stating that patient will only receive controlled substance prescriptions from Dr. Broderick, will only receive a one month supply, will fill prescriptions at one pharmacy, and agrees to a random urine drug screen.  Patient is aware that she must have an office appointment every 90 days to continue to receive benzodiazepines or narcotics.        Follow up in 3 months as scheduled for medical management    I will continue to monitor, evaluate, assess and treat all problems/diagnoses as appropriate and continue to collaborate with specialists.    Contact office or send a  [employer] message with any questions or concerns    Patient will only be notified of labs that require medical intervention.    Prescriptions will not be filled unless you are compliant with your follow up appointments or have a follow up appointment scheduled as per instruction of your physician. Refills should be requested at the time of your visit.    **Charting was completed using voice recognition technology and may include unintended errors**    Caio Broderick DO, FACOFP  Senior Attending Physician  [facility]  [address], [address]  [place] [address] [phone]

## 2023-10-18 ENCOUNTER — TELEPHONE (OUTPATIENT)
Dept: CARDIOLOGY | Facility: CLINIC | Age: 73
End: 2023-10-18
Payer: MEDICARE

## 2023-10-18 DIAGNOSIS — I66.03 OCCLUSION AND STENOSIS OF BILATERAL MIDDLE CEREBRAL ARTERIES: ICD-10-CM

## 2023-10-18 DIAGNOSIS — I65.29 CAROTID STENOSIS: Primary | ICD-10-CM

## 2023-10-18 NOTE — TELEPHONE ENCOUNTER
Patient calls the office stating that she attempted to contact central scheduling to schedule a carotid duplex for September 2024, but there is not an active order for this test. Patient was last seen on 09/22/23, and per OV note, patient is to follow up in one year with a carotid duplex. Please place order.

## 2023-10-18 NOTE — TELEPHONE ENCOUNTER
Order placed by Dr. Easton. Patient is scheduled for carotid duplex on 09/23/24. Called and notified patient of appointment date and time. Patient verbalized understanding.

## 2023-11-27 ENCOUNTER — TELEPHONE (OUTPATIENT)
Dept: PRIMARY CARE | Facility: CLINIC | Age: 73
End: 2023-11-27
Payer: COMMERCIAL

## 2023-11-27 DIAGNOSIS — F41.1 GAD (GENERALIZED ANXIETY DISORDER): Primary | ICD-10-CM

## 2023-11-27 RX ORDER — SERTRALINE HYDROCHLORIDE 50 MG/1
50 TABLET, FILM COATED ORAL DAILY
Qty: 30 TABLET | Refills: 5 | Status: SHIPPED | OUTPATIENT
Start: 2023-11-27 | End: 2023-12-18 | Stop reason: SINTOL

## 2023-11-27 NOTE — TELEPHONE ENCOUNTER
Pt said that the Paxil you prescribed was making her eyes blurry. She is wondering if you can prescribe an alternative med?

## 2023-12-11 ENCOUNTER — TELEPHONE (OUTPATIENT)
Dept: PRIMARY CARE | Facility: CLINIC | Age: 73
End: 2023-12-11
Payer: COMMERCIAL

## 2023-12-11 RX ORDER — OFLOXACIN 3 MG/ML
SOLUTION/ DROPS OPHTHALMIC
COMMUNITY
Start: 2023-08-16 | End: 2023-12-18 | Stop reason: ALTCHOICE

## 2023-12-11 RX ORDER — PREDNISOLONE ACETATE 10 MG/ML
SUSPENSION/ DROPS OPHTHALMIC
COMMUNITY
Start: 2023-08-16 | End: 2023-12-18 | Stop reason: ALTCHOICE

## 2023-12-11 RX ORDER — BROMFENAC SODIUM 0.7 MG/ML
SOLUTION/ DROPS OPHTHALMIC
COMMUNITY
Start: 2023-07-20 | End: 2023-12-18 | Stop reason: ALTCHOICE

## 2023-12-12 ENCOUNTER — LAB (OUTPATIENT)
Dept: LAB | Facility: LAB | Age: 73
End: 2023-12-12
Payer: MEDICARE

## 2023-12-12 DIAGNOSIS — I10 HYPERTENSION, UNSPECIFIED TYPE: ICD-10-CM

## 2023-12-12 LAB
ALBUMIN SERPL BCP-MCNC: 4.1 G/DL (ref 3.4–5)
ALP SERPL-CCNC: 67 U/L (ref 33–136)
ALT SERPL W P-5'-P-CCNC: 18 U/L (ref 7–45)
ANION GAP SERPL CALC-SCNC: 12 MMOL/L (ref 10–20)
AST SERPL W P-5'-P-CCNC: 32 U/L (ref 9–39)
BILIRUB SERPL-MCNC: 0.6 MG/DL (ref 0–1.2)
BUN SERPL-MCNC: 16 MG/DL (ref 6–23)
CALCIUM SERPL-MCNC: 9.4 MG/DL (ref 8.6–10.3)
CHLORIDE SERPL-SCNC: 99 MMOL/L (ref 98–107)
CO2 SERPL-SCNC: 27 MMOL/L (ref 21–32)
CREAT SERPL-MCNC: 0.6 MG/DL (ref 0.5–1.05)
GFR SERPL CREATININE-BSD FRML MDRD: >90 ML/MIN/1.73M*2
GLUCOSE SERPL-MCNC: 99 MG/DL (ref 74–99)
POTASSIUM SERPL-SCNC: 4 MMOL/L (ref 3.5–5.3)
PROT SERPL-MCNC: 6.9 G/DL (ref 6.4–8.2)
SODIUM SERPL-SCNC: 134 MMOL/L (ref 136–145)

## 2023-12-12 PROCEDURE — 80053 COMPREHEN METABOLIC PANEL: CPT

## 2023-12-12 PROCEDURE — 36415 COLL VENOUS BLD VENIPUNCTURE: CPT

## 2023-12-18 ENCOUNTER — OFFICE VISIT (OUTPATIENT)
Dept: PRIMARY CARE | Facility: CLINIC | Age: 73
End: 2023-12-18
Payer: MEDICARE

## 2023-12-18 VITALS
DIASTOLIC BLOOD PRESSURE: 84 MMHG | OXYGEN SATURATION: 98 % | HEIGHT: 62 IN | SYSTOLIC BLOOD PRESSURE: 139 MMHG | WEIGHT: 104 LBS | BODY MASS INDEX: 19.14 KG/M2 | HEART RATE: 76 BPM

## 2023-12-18 DIAGNOSIS — F41.1 GAD (GENERALIZED ANXIETY DISORDER): ICD-10-CM

## 2023-12-18 DIAGNOSIS — R09.89 BRUIT OF RIGHT CAROTID ARTERY: ICD-10-CM

## 2023-12-18 DIAGNOSIS — R10.2 PELVIC PAIN: ICD-10-CM

## 2023-12-18 DIAGNOSIS — R63.4 UNINTENTIONAL WEIGHT LOSS: ICD-10-CM

## 2023-12-18 DIAGNOSIS — M81.0 OSTEOPOROSIS, SENILE: ICD-10-CM

## 2023-12-18 DIAGNOSIS — G47.00 INSOMNIA, UNSPECIFIED TYPE: ICD-10-CM

## 2023-12-18 DIAGNOSIS — F17.218 CIGARETTE NICOTINE DEPENDENCE WITH OTHER NICOTINE-INDUCED DISORDER: ICD-10-CM

## 2023-12-18 DIAGNOSIS — I65.23 BILATERAL CAROTID ARTERY STENOSIS: ICD-10-CM

## 2023-12-18 DIAGNOSIS — I10 HYPERTENSION, UNSPECIFIED TYPE: Primary | ICD-10-CM

## 2023-12-18 DIAGNOSIS — E78.5 HYPERLIPIDEMIA, UNSPECIFIED HYPERLIPIDEMIA TYPE: ICD-10-CM

## 2023-12-18 DIAGNOSIS — J43.9 PULMONARY EMPHYSEMA, UNSPECIFIED EMPHYSEMA TYPE (MULTI): ICD-10-CM

## 2023-12-18 PROCEDURE — 3079F DIAST BP 80-89 MM HG: CPT | Performed by: FAMILY MEDICINE

## 2023-12-18 PROCEDURE — 1159F MED LIST DOCD IN RCRD: CPT | Performed by: FAMILY MEDICINE

## 2023-12-18 PROCEDURE — 99214 OFFICE O/P EST MOD 30 MIN: CPT | Performed by: FAMILY MEDICINE

## 2023-12-18 PROCEDURE — 1160F RVW MEDS BY RX/DR IN RCRD: CPT | Performed by: FAMILY MEDICINE

## 2023-12-18 PROCEDURE — 3075F SYST BP GE 130 - 139MM HG: CPT | Performed by: FAMILY MEDICINE

## 2023-12-18 PROCEDURE — 1126F AMNT PAIN NOTED NONE PRSNT: CPT | Performed by: FAMILY MEDICINE

## 2023-12-18 RX ORDER — VALSARTAN AND HYDROCHLOROTHIAZIDE 160; 12.5 MG/1; MG/1
1 TABLET, FILM COATED ORAL DAILY
Qty: 90 TABLET | Refills: 2 | Status: SHIPPED | OUTPATIENT
Start: 2023-12-18

## 2023-12-18 RX ORDER — ATORVASTATIN CALCIUM 10 MG/1
10 TABLET, FILM COATED ORAL DAILY
Qty: 90 TABLET | Refills: 2 | Status: SHIPPED | OUTPATIENT
Start: 2023-12-18

## 2023-12-18 RX ORDER — ALENDRONATE SODIUM 70 MG/1
70 TABLET ORAL
Qty: 12 TABLET | Refills: 2 | Status: SHIPPED | OUTPATIENT
Start: 2023-12-18 | End: 2024-09-13

## 2023-12-18 RX ORDER — ALPRAZOLAM 0.5 MG/1
0.5 TABLET ORAL 2 TIMES DAILY PRN
Qty: 40 TABLET | Refills: 2 | Status: SHIPPED | OUTPATIENT
Start: 2023-12-18 | End: 2024-03-21 | Stop reason: SDUPTHER

## 2023-12-18 NOTE — PROGRESS NOTES
General Medical Management Note    73 y.o. female presents for Medical Management  HPI    Osteoporosis: Patient's last DEXA scan in July 2023 showed osteoporosis in the femur and femoral neck.  Spine bone density was normal.  She has not started Fosamax yet due to recent cataract surgery in Aug and dental work, but continues calcium and vitamin D supplement as well as weightbearing exercise.    Anxiety - started Zoloft but became dizzy, blurred vision (same as Paxil) and fatigued and dizziness.  Xanax used once -twice per day.  Last dose last night.  Several death anniversaries this month.    Got flu and covid vaccines 10/2023.  Has not received Shingrix but did receive Zostavax.    Unintentional weight loss: not hungry;  normal BM;  last colonoscopy normal in 2021;  mammogram normal in 4/2023.  She denies abdominal fullness, bloating.    Carotoid bruit - monitored by Vascular Surgeon, Dr. Nupur Easton    History of skin cancer.  Monitored by dermatology.    COPD: Patient continues to smoke daily.  She is losing weight.  She is very active.  She does not have symptoms of COPD.  She is interested in continuing to screen for lung cancer with low-dose CT chest.  Due in January 2024.    Past Medical History:   Diagnosis Date    Actinic keratoses 06/25/2023    Formatting of this note might be different from the original. UPPER CHEST LEFT OF MIDLINE, RIGHT MEDIAL CHEEK    Basal cell carcinoma of skin, unspecified     Skin cancer, basal cell    Other conditions influencing health status     Normal cardiac stress test    Personal history of diseases of the skin and subcutaneous tissue 09/15/2014    History of rosacea    Personal history of other diseases of the musculoskeletal system and connective tissue     History of osteoarthritis    Personal history of other medical treatment     H/O bone density study    Personal history of other medical treatment     History of mammogram    Personal history of other medical  treatment     History of Papanicolaou smear of cervix    Pneumonia, unspecified organism 10/15/2018    Pneumonia, primary atypical    Squamous cell carcinoma, arm 2023    Formatting of this note might be different from the original. LEFT UPPER ARM      Past Surgical History:   Procedure Laterality Date    APPENDECTOMY  09/15/2014    Appendectomy     SECTION, CLASSIC  09/15/2014     Section    COLONOSCOPY  2016    Complete Colonoscopy    CT ANGIO NECK W  3/7/2022    CT NECK ANGIO W AND WO IV CONTRAST 3/7/2022 [facility] ANCILLARY LEGACY    CT HEAD ANGIO W AND WO IV CONTRAST  3/7/2022    CT HEAD ANGIO W AND WO IV CONTRAST 3/7/2022 [facility] ANCILLARY LEGACY    OTHER SURGICAL HISTORY  09/15/2014    Endarterectomy Common Carotid    OTHER SURGICAL HISTORY  2018    Carotid thromboendarterectomy    OTHER SURGICAL HISTORY  2017    Mohs Micrographic Surgery Nose     Family History   Problem Relation Name Age of Onset    Other (worker's pneumoconiosis) Father          's    Fibromyalgia Sister        Social History     Socioeconomic History    Marital status:      Spouse name: Not on file    Number of children: Not on file    Years of education: Not on file    Highest education level: Not on file   Occupational History    Not on file   Tobacco Use    Smoking status: Every Day     Packs/day: 1.00     Years: 55.00     Additional pack years: 0.00     Total pack years: 55.00     Types: Cigarettes    Smokeless tobacco: Never   Substance and Sexual Activity    Alcohol use: Not Currently    Drug use: Never    Sexual activity: Not on file   Other Topics Concern    Not on file   Social History Narrative    Not on file     Social Determinants of Health     Financial Resource Strain: Not on file   Food Insecurity: Not on file   Transportation Needs: Not on file   Physical Activity: Not on file   Stress: Not on file   Social Connections: Not on file   Intimate Partner Violence: Not on file  "  Housing Stability: Not on file       Current Outpatient Medications on File Prior to Visit   Medication Sig Dispense Refill    ALPRAZolam (Xanax) 0.5 mg tablet Take 1 tablet (0.5 mg) by mouth 2 times a day as needed for anxiety. 40 tablet 2    aspirin 81 mg EC tablet Take 1 tablet (81 mg) by mouth once daily. 30 tablet 11    atorvastatin (Lipitor) 10 mg tablet Take 1 tablet (10 mg) by mouth once daily. 90 tablet 2    calcium carbonate-vitamin D3 500 mg-5 mcg (200 unit) tablet Take 2 tablets by mouth once daily.      multivitamin-min-iron-FA-vit K (Adults Multivitamin) 18 mg iron-400 mcg-25 mcg tablet Take 1 tablet by mouth once daily.      ofloxacin (Ocuflox) 0.3 % ophthalmic solution       prednisoLONE acetate (Pred-Forte) 1 % ophthalmic suspension       Prolensa 0.07 % drops       sertraline (Zoloft) 50 mg tablet Take 1 tablet (50 mg) by mouth once daily. 30 tablet 5    valsartan-hydrochlorothiazide (Diovan-HCT) 160-12.5 mg tablet Take 1 tablet by mouth once daily. 90 tablet 2    alendronate (Fosamax) 70 mg tablet Take 1 tablet (70 mg) by mouth every 7 days. Take in the morning with a full glass of water, on an empty stomach, and do not take anything else by mouth or lie down for the next 30 min. (Patient not taking: Reported on 12/18/2023) 12 tablet 2     No current facility-administered medications on file prior to visit.       Allergies   Allergen Reactions    Paxil [Paroxetine Hcl] Other     Blurred vision         ROS: Denies chest pain, SOB, Headache, GI problems     Visit Vitals  /84   Pulse 76   Ht 1.575 m (5' 2\")   Wt 47.2 kg (104 lb)   SpO2 98%   BMI 19.02 kg/m²   Smoking Status Every Day   BSA 1.44 m²        PHYSICAL EXAM:  Alert and oriented x3.  Eyes: EOM grossly intact  Neck supple without lymph adenopathy.  Significant bruit of bilateral carotid arteries with right being louder. No masses or thyromegaly  Heart regular rate and rhythm without murmur.  Lungs clear to auscultation.  Legs without " edema.  Gait is non-antalgic  Speech clear.  Hearing adequate.      Lab reviewed in detail with patient    DIAGNOSIS/PLAN:    1. Hypertension, unspecified type  - Comprehensive Metabolic Panel; Future  - valsartan-hydrochlorothiazide (Diovan-HCT) 160-12.5 mg tablet; Take 1 tablet by mouth once daily.  Dispense: 90 tablet; Refill: 2    2. Cigarette nicotine dependence with other nicotine-induced disorder  - CT lung screening low dose; Future    3. Osteoporosis, senile  - alendronate (Fosamax) 70 mg tablet; Take 1 tablet (70 mg) by mouth every 7 days. Take in the morning with a full glass of water, on an empty stomach, and do not take anything else by mouth or lie down for the next 30 min.  Dispense: 12 tablet; Refill: 2    4. KB (generalized anxiety disorder)  - ALPRAZolam (Xanax) 0.5 mg tablet; Take 1 tablet (0.5 mg) by mouth 2 times a day as needed for anxiety.  Dispense: 40 tablet; Refill: 2    5. Hyperlipidemia, unspecified hyperlipidemia type  - atorvastatin (Lipitor) 10 mg tablet; Take 1 tablet (10 mg) by mouth once daily.  Dispense: 90 tablet; Refill: 2    6. Pelvic pain  Concern for gynecologic cancer  - US pelvis; Future    7. Unintentional weight loss  Suggested frequent nibbling throughout the day, especially cheese, meats, nuts, dairy.  - US pelvis; Future    8. Bruit of right carotid artery  Follow-up with vascular surgeon    9. Bilateral carotid artery stenosis  Continue follow-up with vascular surgeon    10. Pulmonary emphysema, unspecified emphysema type (CMS/HCC)  Tobacco use: Patient encouraged to stop smoking.  The patient is aware of the detrimental effects of long-term smoking on overall health and life expectancy.  Call 4-823-QUIT-NOW for additional stop smoking counselling free of charge.    Over the past several years, I have counseled the patient about smoking/tobacco cessation and how I can support efforts when patient is ready to quit.  I have discussed nicotine replacement therapy,  Chantix, Wellbutrin, hypnosis, support groups and acupuncture as potential options.  Patient currently has no signs or symptoms of tobacco related disease.  If interested in hypnotism, ask for referral to [facility] or call [employer] in [place] at [phone]    11. Insomnia, unspecified type  Continue Xanax at bedtime.      Return to office in 6 months for comprehensive medical evaluation, long-term medication use monitoring, and preventative services screening    We will continue to monitor, evaluate, assess and treat all problems/diagnoses as appropriate and continue to collaborate with specialists.    Encouraged to sign up with [facility]    Contact office or send a  [employer] message with any questions or concerns    Patient will only be notified of labs that require medical intervention.    Prescriptions will not be filled unless you are compliant with your follow up appointments or have a follow up appointment scheduled as per instruction of your physician. Refills should be requested at the time of your visit.    **Charting was completed using voice recognition technology and may include unintended errors**    Caio Broderick DO, YOSELIN  [address]  [place] [address] [phone]  Caio Broderick DO, FACOFP

## 2024-01-18 ENCOUNTER — ANCILLARY PROCEDURE (OUTPATIENT)
Dept: RADIOLOGY | Facility: CLINIC | Age: 74
End: 2024-01-18
Payer: MEDICARE

## 2024-01-18 DIAGNOSIS — R10.2 PELVIC AND PERINEAL PAIN: ICD-10-CM

## 2024-01-18 DIAGNOSIS — R63.4 UNINTENTIONAL WEIGHT LOSS: ICD-10-CM

## 2024-01-18 DIAGNOSIS — F17.218 CIGARETTE NICOTINE DEPENDENCE WITH OTHER NICOTINE-INDUCED DISORDER: ICD-10-CM

## 2024-01-18 DIAGNOSIS — R10.2 PELVIC PAIN: ICD-10-CM

## 2024-01-18 PROCEDURE — 76857 US EXAM PELVIC LIMITED: CPT | Performed by: RADIOLOGY

## 2024-01-18 PROCEDURE — 76856 US EXAM PELVIC COMPLETE: CPT

## 2024-01-18 PROCEDURE — 71271 CT THORAX LUNG CANCER SCR C-: CPT | Performed by: RADIOLOGY

## 2024-01-18 PROCEDURE — 76830 TRANSVAGINAL US NON-OB: CPT | Performed by: RADIOLOGY

## 2024-01-18 PROCEDURE — 71271 CT THORAX LUNG CANCER SCR C-: CPT

## 2024-01-18 PROCEDURE — 76830 TRANSVAGINAL US NON-OB: CPT

## 2024-02-05 ENCOUNTER — TELEPHONE (OUTPATIENT)
Dept: PRIMARY CARE | Facility: CLINIC | Age: 74
End: 2024-02-05
Payer: COMMERCIAL

## 2024-02-05 NOTE — TELEPHONE ENCOUNTER
Pt is calling stating that she is having pain in her left hip and upper thigh. Pt states that it hurts when she walks and feels sore. Pt states that you started her on Alendronate 70 MG on 12-18-23. Pt states that she read that a side effect of the medication can be joint pain and patient wanted to know if that could be the cause of her pain? Pt is asking if someone can give her a call to let her know or if you recommend that she be seen?

## 2024-02-06 NOTE — TELEPHONE ENCOUNTER
Patient notified. She will discontinue Fosamax for one month then call office to update me or Dr. Broderick on pain.

## 2024-02-16 ENCOUNTER — OFFICE VISIT (OUTPATIENT)
Dept: PRIMARY CARE | Facility: CLINIC | Age: 74
End: 2024-02-16
Payer: MEDICARE

## 2024-02-16 ENCOUNTER — HOSPITAL ENCOUNTER (OUTPATIENT)
Dept: RADIOLOGY | Facility: CLINIC | Age: 74
Discharge: HOME | End: 2024-02-16
Payer: MEDICARE

## 2024-02-16 VITALS
OXYGEN SATURATION: 97 % | SYSTOLIC BLOOD PRESSURE: 122 MMHG | HEIGHT: 62 IN | DIASTOLIC BLOOD PRESSURE: 70 MMHG | BODY MASS INDEX: 19.69 KG/M2 | WEIGHT: 107 LBS | HEART RATE: 82 BPM

## 2024-02-16 DIAGNOSIS — E46 PROTEIN-CALORIE MALNUTRITION, UNSPECIFIED SEVERITY (MULTI): ICD-10-CM

## 2024-02-16 DIAGNOSIS — J43.9 PULMONARY EMPHYSEMA, UNSPECIFIED EMPHYSEMA TYPE (MULTI): ICD-10-CM

## 2024-02-16 DIAGNOSIS — M54.50 ACUTE LEFT-SIDED LOW BACK PAIN WITHOUT SCIATICA: ICD-10-CM

## 2024-02-16 DIAGNOSIS — M25.552 PAIN OF LEFT HIP: Primary | ICD-10-CM

## 2024-02-16 DIAGNOSIS — I70.0 ATHEROSCLEROSIS OF AORTA (CMS-HCC): ICD-10-CM

## 2024-02-16 DIAGNOSIS — M25.552 PAIN OF LEFT HIP: ICD-10-CM

## 2024-02-16 PROCEDURE — 72100 X-RAY EXAM L-S SPINE 2/3 VWS: CPT

## 2024-02-16 PROCEDURE — 99214 OFFICE O/P EST MOD 30 MIN: CPT | Performed by: FAMILY MEDICINE

## 2024-02-16 PROCEDURE — 73502 X-RAY EXAM HIP UNI 2-3 VIEWS: CPT | Mod: LEFT SIDE | Performed by: RADIOLOGY

## 2024-02-16 PROCEDURE — 73502 X-RAY EXAM HIP UNI 2-3 VIEWS: CPT | Mod: LT

## 2024-02-16 PROCEDURE — 1126F AMNT PAIN NOTED NONE PRSNT: CPT | Performed by: FAMILY MEDICINE

## 2024-02-16 PROCEDURE — 1159F MED LIST DOCD IN RCRD: CPT | Performed by: FAMILY MEDICINE

## 2024-02-16 PROCEDURE — 72100 X-RAY EXAM L-S SPINE 2/3 VWS: CPT | Performed by: RADIOLOGY

## 2024-02-16 PROCEDURE — 3074F SYST BP LT 130 MM HG: CPT | Performed by: FAMILY MEDICINE

## 2024-02-16 PROCEDURE — 3078F DIAST BP <80 MM HG: CPT | Performed by: FAMILY MEDICINE

## 2024-02-16 RX ORDER — TRAMADOL HYDROCHLORIDE 50 MG/1
50 TABLET ORAL EVERY 8 HOURS PRN
Qty: 30 TABLET | Refills: 0 | Status: SHIPPED | OUTPATIENT
Start: 2024-02-16 | End: 2024-02-26

## 2024-02-16 NOTE — PROGRESS NOTES
"Subjective     Patient ID: [id number]     Laila Gonzales is a 73 y.o. female who presents for left hip pain.    HPI  Began 2 wks ago, soon after starting Fosamax  Aleve 2 TID.  Pain worse when puts pressure on left leg.  Only pain in bed when moves leg.  Not sleeping well due to family stress.  Despite xanax at bedtime.  Objective   /70   Pulse 82   Ht 1.575 m (5' 2\")   Wt 48.5 kg (107 lb)   SpO2 97%   BMI 19.57 kg/m²    Physical Exam:   Thin female with obvious distress upon standing and walking.  Favoring left leg.  Pain located in the left upper gluteus with radiation to the left hip.    Assessment/Plan   1. Pain of left hip  - XR hip left with pelvis when performed 2 or 3 views; Future  - traMADol (Ultram) 50 mg tablet; Take 1 tablet (50 mg) by mouth every 8 hours if needed for severe pain (7 - 10) for up to 10 days.  Dispense: 30 tablet; Refill: 0  -If pain persists after 2 weeks, consider physical therapy or MRI of the hip depending on results of hip x-ray.    2. Acute left-sided low back pain without sciatica  - XR lumbar spine 2-3 views; Future  - traMADol (Ultram) 50 mg tablet; Take 1 tablet (50 mg) by mouth every 8 hours if needed for severe pain (7 - 10) for up to 10 days.  Dispense: 30 tablet; Refill: 0    3. Protein-calorie malnutrition, unspecified severity (CMS/HCC)  Chronic.  Stable.    4. Pulmonary emphysema, unspecified emphysema type (CMS/HCC)  Continues to smoke cigarettes.  Is not ready or willing to discontinue at this time    5. Atherosclerosis of aorta (CMS/HCC)  Will continue to monitor.  Recommend patient stop smoking.        Follow up in office if pain persists greater than 30 days.  Consider MRI    I will continue to monitor, evaluate, assess and treat all problems/diagnoses as appropriate and continue to collaborate with specialists.    Contact office or send a  MY Chart message with any questions or concerns    Encouraged to sign up with my  My Chart  Patient will only be " notified of labs that require medical intervention.    Prescriptions will not be filled unless you are compliant with your follow up appointments or have a follow up appointment scheduled as per instruction of your physician. Refills should be requested at the time of your visit.    **Charting was completed using voice recognition technology and may include unintended errors**    Caio Broderick DO, [facility]  [address]  [place] [id number] [phone]    Problem List Items Addressed This Visit    None      Caio Broderick DO

## 2024-03-02 PROBLEM — R63.4 UNINTENDED WEIGHT LOSS: Status: RESOLVED | Noted: 2024-03-02 | Resolved: 2024-03-02

## 2024-03-02 PROBLEM — J20.8 ACUTE BACTERIAL BRONCHITIS: Status: RESOLVED | Noted: 2024-03-02 | Resolved: 2024-03-02

## 2024-03-02 PROBLEM — R94.39 ABNORMAL CARDIOVASCULAR STRESS TEST: Status: ACTIVE | Noted: 2023-01-17

## 2024-03-02 PROBLEM — B96.89 ACUTE BACTERIAL BRONCHITIS: Status: RESOLVED | Noted: 2024-03-02 | Resolved: 2024-03-02

## 2024-03-02 PROBLEM — M25.559 ARTHRALGIA OF HIP: Status: RESOLVED | Noted: 2024-03-02 | Resolved: 2024-03-02

## 2024-03-02 PROBLEM — M99.03 SOMATIC DYSFUNCTION OF LUMBOSACRAL REGION: Status: RESOLVED | Noted: 2023-02-10 | Resolved: 2024-03-02

## 2024-03-09 ENCOUNTER — APPOINTMENT (OUTPATIENT)
Dept: PRIMARY CARE | Facility: CLINIC | Age: 74
End: 2024-03-09
Payer: COMMERCIAL

## 2024-03-18 ENCOUNTER — APPOINTMENT (OUTPATIENT)
Dept: PRIMARY CARE | Facility: CLINIC | Age: 74
End: 2024-03-18
Payer: COMMERCIAL

## 2024-03-21 ENCOUNTER — OFFICE VISIT (OUTPATIENT)
Dept: PRIMARY CARE | Facility: CLINIC | Age: 74
End: 2024-03-21
Payer: MEDICARE

## 2024-03-21 VITALS
BODY MASS INDEX: 19.69 KG/M2 | SYSTOLIC BLOOD PRESSURE: 116 MMHG | DIASTOLIC BLOOD PRESSURE: 74 MMHG | HEIGHT: 62 IN | WEIGHT: 107 LBS

## 2024-03-21 DIAGNOSIS — F41.1 GAD (GENERALIZED ANXIETY DISORDER): ICD-10-CM

## 2024-03-21 PROCEDURE — 3074F SYST BP LT 130 MM HG: CPT | Performed by: FAMILY MEDICINE

## 2024-03-21 PROCEDURE — 1159F MED LIST DOCD IN RCRD: CPT | Performed by: FAMILY MEDICINE

## 2024-03-21 PROCEDURE — 99213 OFFICE O/P EST LOW 20 MIN: CPT | Performed by: FAMILY MEDICINE

## 2024-03-21 PROCEDURE — 3078F DIAST BP <80 MM HG: CPT | Performed by: FAMILY MEDICINE

## 2024-03-21 RX ORDER — ALPRAZOLAM 0.5 MG/1
0.5 TABLET ORAL 2 TIMES DAILY PRN
Qty: 40 TABLET | Refills: 2 | Status: SHIPPED | OUTPATIENT
Start: 2024-04-11

## 2024-03-21 NOTE — PROGRESS NOTES
HPI 73 y.o. female presents for evaluation and refill of controlled substance.      Anxiety is managed with Xanax.  Patient states that she usually takes 1/day and occasionally to tablets.  Her last dose was last night.    Past Medical History:   Diagnosis Date    Actinic keratoses 2023    Formatting of this note might be different from the original. UPPER CHEST LEFT OF MIDLINE, RIGHT MEDIAL CHEEK    Acute bacterial bronchitis 2024    Arthralgia of hip 2024    Basal cell carcinoma of skin, unspecified     Skin cancer, basal cell    Other conditions influencing health status     Normal cardiac stress test    Personal history of diseases of the skin and subcutaneous tissue 09/15/2014    History of rosacea    Personal history of other diseases of the musculoskeletal system and connective tissue     History of osteoarthritis    Personal history of other medical treatment     H/O bone density study    Personal history of other medical treatment     History of mammogram    Personal history of other medical treatment     History of Papanicolaou smear of cervix    Pneumonia, unspecified organism 10/15/2018    Pneumonia, primary atypical    Somatic dysfunction of lumbosacral region 02/10/2023    Squamous cell carcinoma, arm 2023    Formatting of this note might be different from the original. LEFT UPPER ARM    Unintended weight loss 2024      Past Surgical History:   Procedure Laterality Date    APPENDECTOMY  09/15/2014    Appendectomy    CATARACT EXTRACTION EXTRACAPSULAR W/ INTRAOCULAR LENS IMPLANTATION Bilateral 2023     SECTION, CLASSIC  09/15/2014     Section    COLONOSCOPY  2016    Complete Colonoscopy    CT ANGIO NECK  2022    CT NECK ANGIO W AND WO IV CONTRAST 3/7/2022 [facility] [place]    CT HEAD ANGIO W AND WO IV CONTRAST  2022    CT HEAD ANGIO W AND WO IV CONTRAST 3/7/2022 [facility] [place] [place]    OTHER SURGICAL HISTORY  09/15/2014     Endarterectomy Common Carotid    OTHER SURGICAL HISTORY  11/08/2018    Carotid thromboendarterectomy    OTHER SURGICAL HISTORY  04/17/2017    Mohs Micrographic Surgery Nose     Family History   Problem Relation Name Age of Onset    Other (worker's pneumoconiosis) Father          's    Fibromyalgia Sister        Social History     Socioeconomic History    Marital status:      Spouse name: Not on file    Number of children: Not on file    Years of education: Not on file    Highest education level: Not on file   Occupational History    Not on file   Tobacco Use    Smoking status: Every Day     Packs/day: 1.00     Years: 55.00     Additional pack years: 0.00     Total pack years: 55.00     Types: Cigarettes    Smokeless tobacco: Never   Substance and Sexual Activity    Alcohol use: Not Currently    Drug use: Never    Sexual activity: Not on file   Other Topics Concern    Not on file   Social History Narrative    Not on file     Social Determinants of Health     Financial Resource Strain: Not on file   Food Insecurity: Not on file   Transportation Needs: Not on file   Physical Activity: Not on file   Stress: Not on file   Social Connections: Not on file   Intimate Partner Violence: Not on file   Housing Stability: Not on file       Current Outpatient Medications on File Prior to Visit   Medication Sig Dispense Refill    alendronate (Fosamax) 70 mg tablet Take 1 tablet (70 mg) by mouth every 7 days. Take in the morning with a full glass of water, on an empty stomach, and do not take anything else by mouth or lie down for the next 30 min. 12 tablet 2    ALPRAZolam (Xanax) 0.5 mg tablet Take 1 tablet (0.5 mg) by mouth 2 times a day as needed for anxiety. 40 tablet 2    aspirin 81 mg EC tablet Take 1 tablet (81 mg) by mouth once daily. 30 tablet 11    atorvastatin (Lipitor) 10 mg tablet Take 1 tablet (10 mg) by mouth once daily. 90 tablet 2    calcium carbonate-vitamin D3 500 mg-5 mcg (200 unit) tablet Take  "2 tablets by mouth once daily.      multivitamin-min-iron-FA-vit K (Adults Multivitamin) 18 mg iron-400 mcg-25 mcg tablet Take 1 tablet by mouth once daily.      valsartan-hydrochlorothiazide (Diovan-HCT) 160-12.5 mg tablet Take 1 tablet by mouth once daily. 90 tablet 2     No current facility-administered medications on file prior to visit.       Allergies   Allergen Reactions    Paroxetine Other    Paxil [Paroxetine Hcl] Other     Blurred vision    Zoloft [Sertraline] Blurred vision       Visit Vitals  /74   Ht 1.575 m (5' 2\")   Wt 48.5 kg (107 lb)   BMI 19.57 kg/m²   Smoking Status Every Day   BSA 1.46 m²        EXAM:  Alert and oriented ×3.  No acute distress.  No tremors noted.  Gait is normal.  Mood and affect are normal.     Assessment/Diagnosis  1. KB (generalized anxiety disorder)  - ALPRAZolam (Xanax) 0.5 mg tablet; Take 1 tablet (0.5 mg) by mouth 2 times a day as needed for anxiety. Do not start before April 11, 2024.  Dispense: 40 tablet; Refill: 2        Plan    [place] reviewed.  Controlled Substance Agreement is current.  Urine drug screen up to date.    CONTROLLED SUBSTANCE USE:   Patient is aware of Dr. Broderick's and Ashlyn Hazel's practice rules for use of scheduled medication.  Has a signed contract stating that patient will only receive controlled substance prescriptions from Dr. Broderick, will only receive a one month supply, will fill prescriptions at one pharmacy, and agrees to a random urine drug screen.  Patient is aware that she must have an office appointment every 90 days to continue to receive benzodiazepines or narcotics.        Follow up in 3 months as scheduled for medical management    I will continue to monitor, evaluate, assess and treat all problems/diagnoses as appropriate and continue to collaborate with specialists.    Contact office or send a  [employer] message with any questions or concerns    Patient will only be notified of labs that require medical " intervention.    Prescriptions will not be filled unless you are compliant with your follow up appointments or have a follow up appointment scheduled as per instruction of your physician. Refills should be requested at the time of your visit.    **Charting was completed using voice recognition technology and may include unintended errors**    Caio Broderick DO, FACOFP  Senior Attending Physician  [facility]  [address], [address]  [place] [address] [phone]

## 2024-04-04 ENCOUNTER — OFFICE VISIT (OUTPATIENT)
Dept: OBSTETRICS AND GYNECOLOGY | Facility: CLINIC | Age: 74
End: 2024-04-04
Payer: MEDICARE

## 2024-04-04 VITALS
WEIGHT: 108.25 LBS | SYSTOLIC BLOOD PRESSURE: 128 MMHG | HEIGHT: 63 IN | DIASTOLIC BLOOD PRESSURE: 70 MMHG | BODY MASS INDEX: 19.18 KG/M2

## 2024-04-04 DIAGNOSIS — Z01.419 WELL WOMAN EXAM WITH ROUTINE GYNECOLOGICAL EXAM: Primary | ICD-10-CM

## 2024-04-04 PROCEDURE — G0101 CA SCREEN;PELVIC/BREAST EXAM: HCPCS | Performed by: OBSTETRICS & GYNECOLOGY

## 2024-04-04 PROCEDURE — 1159F MED LIST DOCD IN RCRD: CPT | Performed by: OBSTETRICS & GYNECOLOGY

## 2024-04-04 PROCEDURE — 3074F SYST BP LT 130 MM HG: CPT | Performed by: OBSTETRICS & GYNECOLOGY

## 2024-04-04 PROCEDURE — 3078F DIAST BP <80 MM HG: CPT | Performed by: OBSTETRICS & GYNECOLOGY

## 2024-04-04 NOTE — PROGRESS NOTES
Subjective   Patient ID: Laila Gonzales is a 73 y.o. female who presents for Annual Exam.    Last pap: yrs ago  Last mamm: 4/27/23, ordered for this year  LMP: absent  Contraception: menopause  Sexually active: no  Self breast exam: no  Diet:  not very balanced  Exercise: no, but active    HPI  Doing well. No complaints.     Review of Systems  Neg   Objective   Physical Exam  Physical Exam         Appearance: Normal appearance. Affect normal and alert  Pulmonary:      Effort: Pulmonary effort is normal. Breath sounds clear  Skin: no rashes or lesions   Breasts:     Breasts bilaterally are symmetrical. No masses or axillary adenopathy. No skin or nipple changes    Abdominal:     Abdomen is flat, soft, nontender. No distension. No mass palpated.      Genitourinary:     Labia: no skin lesions or rash       Urethra: No lesions.      Bladder with no prolapse     Vagina: No discharge, mucosa is pink with no lesions.     Cervix:    mobile and nontender no discharge     Uterus:   Not enlarged, small, nontender.      Adnexa: Bilaterally with  No mass or tenderness.            Extremities:  Nontender, no edema. Normal range of motion    Assessment/Plan   Diagnoses and all orders for this visit:  Well woman exam with routine gynecological exam       Declines mammo screen this year . No masses on exam .  MD Cassidy Baker, [facility] 04/04/24 9:27 AM

## 2024-06-17 ENCOUNTER — LAB (OUTPATIENT)
Dept: LAB | Facility: LAB | Age: 74
End: 2024-06-17
Payer: MEDICARE

## 2024-06-17 DIAGNOSIS — I10 HYPERTENSION, UNSPECIFIED TYPE: ICD-10-CM

## 2024-06-17 DIAGNOSIS — E55.9 VITAMIN D DEFICIENCY: ICD-10-CM

## 2024-06-17 DIAGNOSIS — Z02.83 ENCOUNTER FOR DRUG SCREENING: ICD-10-CM

## 2024-06-17 DIAGNOSIS — Z51.81 ENCOUNTER FOR THERAPEUTIC DRUG LEVEL MONITORING: ICD-10-CM

## 2024-06-17 DIAGNOSIS — R30.0 DYSURIA: ICD-10-CM

## 2024-06-17 DIAGNOSIS — E78.5 HYPERLIPIDEMIA, UNSPECIFIED HYPERLIPIDEMIA TYPE: ICD-10-CM

## 2024-06-17 LAB
25(OH)D3 SERPL-MCNC: 85 NG/ML (ref 30–100)
ALBUMIN SERPL BCP-MCNC: 4.1 G/DL (ref 3.4–5)
ALP SERPL-CCNC: 62 U/L (ref 33–136)
ALT SERPL W P-5'-P-CCNC: 13 U/L (ref 7–45)
AMPHETAMINES UR QL SCN: NORMAL
ANION GAP SERPL CALC-SCNC: 13 MMOL/L (ref 10–20)
APPEARANCE UR: ABNORMAL
AST SERPL W P-5'-P-CCNC: 24 U/L (ref 9–39)
BACTERIA #/AREA URNS AUTO: ABNORMAL /HPF
BARBITURATES UR QL SCN: NORMAL
BILIRUB SERPL-MCNC: 0.9 MG/DL (ref 0–1.2)
BILIRUB UR STRIP.AUTO-MCNC: NEGATIVE MG/DL
BUN SERPL-MCNC: 19 MG/DL (ref 6–23)
BZE UR QL SCN: NORMAL
CALCIUM SERPL-MCNC: 10.3 MG/DL (ref 8.6–10.6)
CANNABINOIDS UR QL SCN: NORMAL
CHLORIDE SERPL-SCNC: 104 MMOL/L (ref 98–107)
CHOLEST SERPL-MCNC: 142 MG/DL (ref 0–199)
CHOLESTEROL/HDL RATIO: 4.1
CO2 SERPL-SCNC: 29 MMOL/L (ref 21–32)
COLOR UR: YELLOW
CREAT SERPL-MCNC: 0.59 MG/DL (ref 0.5–1.05)
CREAT UR-MCNC: 167.6 MG/DL (ref 20–320)
EGFRCR SERPLBLD CKD-EPI 2021: >90 ML/MIN/1.73M*2
ERYTHROCYTE [DISTWIDTH] IN BLOOD BY AUTOMATED COUNT: 12.1 % (ref 11.5–14.5)
GLUCOSE SERPL-MCNC: 97 MG/DL (ref 74–99)
GLUCOSE UR STRIP.AUTO-MCNC: NORMAL MG/DL
HCT VFR BLD AUTO: 44.4 % (ref 36–46)
HDLC SERPL-MCNC: 34.9 MG/DL
HGB BLD-MCNC: 15.1 G/DL (ref 12–16)
KETONES UR STRIP.AUTO-MCNC: NEGATIVE MG/DL
LDLC SERPL CALC-MCNC: 89 MG/DL
LEUKOCYTE ESTERASE UR QL STRIP.AUTO: ABNORMAL
MCH RBC QN AUTO: 36.6 PG (ref 26–34)
MCHC RBC AUTO-ENTMCNC: 34 G/DL (ref 32–36)
MCV RBC AUTO: 108 FL (ref 80–100)
MUCOUS THREADS #/AREA URNS AUTO: ABNORMAL /LPF
NITRITE UR QL STRIP.AUTO: NEGATIVE
NON HDL CHOLESTEROL: 107 MG/DL (ref 0–149)
NRBC BLD-RTO: 0 /100 WBCS (ref 0–0)
PCP UR QL SCN: NORMAL
PH UR STRIP.AUTO: 6.5 [PH]
PLATELET # BLD AUTO: 169 X10*3/UL (ref 150–450)
POTASSIUM SERPL-SCNC: 3.9 MMOL/L (ref 3.5–5.3)
PROT SERPL-MCNC: 6.5 G/DL (ref 6.4–8.2)
PROT UR STRIP.AUTO-MCNC: ABNORMAL MG/DL
RBC # BLD AUTO: 4.13 X10*6/UL (ref 4–5.2)
RBC # UR STRIP.AUTO: NEGATIVE /UL
RBC #/AREA URNS AUTO: ABNORMAL /HPF
SODIUM SERPL-SCNC: 142 MMOL/L (ref 136–145)
SP GR UR STRIP.AUTO: 1.02
SQUAMOUS #/AREA URNS AUTO: ABNORMAL /HPF
TRIGL SERPL-MCNC: 92 MG/DL (ref 0–149)
TSH SERPL-ACNC: 0.61 MIU/L (ref 0.44–3.98)
URATE CRY #/AREA UR COMP ASSIST: ABNORMAL /HPF
UROBILINOGEN UR STRIP.AUTO-MCNC: ABNORMAL MG/DL
VLDL: 18 MG/DL (ref 0–40)
WBC # BLD AUTO: 7.8 X10*3/UL (ref 4.4–11.3)
WBC #/AREA URNS AUTO: ABNORMAL /HPF

## 2024-06-17 PROCEDURE — 85027 COMPLETE CBC AUTOMATED: CPT

## 2024-06-17 PROCEDURE — 36415 COLL VENOUS BLD VENIPUNCTURE: CPT

## 2024-06-17 PROCEDURE — 80307 DRUG TEST PRSMV CHEM ANLYZR: CPT

## 2024-06-17 PROCEDURE — 80061 LIPID PANEL: CPT

## 2024-06-17 PROCEDURE — 80368 SEDATIVE HYPNOTICS: CPT

## 2024-06-17 PROCEDURE — 81001 URINALYSIS AUTO W/SCOPE: CPT

## 2024-06-17 PROCEDURE — 80373 DRUG SCREENING TRAMADOL: CPT

## 2024-06-17 PROCEDURE — 80053 COMPREHEN METABOLIC PANEL: CPT

## 2024-06-17 PROCEDURE — 80365 DRUG SCREENING OXYCODONE: CPT

## 2024-06-17 PROCEDURE — 80346 BENZODIAZEPINES1-12: CPT

## 2024-06-17 PROCEDURE — 80361 OPIATES 1 OR MORE: CPT

## 2024-06-17 PROCEDURE — 84443 ASSAY THYROID STIM HORMONE: CPT

## 2024-06-17 PROCEDURE — 82306 VITAMIN D 25 HYDROXY: CPT

## 2024-06-17 PROCEDURE — 80358 DRUG SCREENING METHADONE: CPT

## 2024-06-17 PROCEDURE — 82570 ASSAY OF URINE CREATININE: CPT

## 2024-06-17 PROCEDURE — 80354 DRUG SCREENING FENTANYL: CPT

## 2024-06-20 ENCOUNTER — APPOINTMENT (OUTPATIENT)
Dept: PRIMARY CARE | Facility: CLINIC | Age: 74
End: 2024-06-20
Payer: COMMERCIAL

## 2024-06-20 VITALS
WEIGHT: 108 LBS | DIASTOLIC BLOOD PRESSURE: 62 MMHG | HEIGHT: 62 IN | HEART RATE: 70 BPM | BODY MASS INDEX: 19.88 KG/M2 | SYSTOLIC BLOOD PRESSURE: 120 MMHG | OXYGEN SATURATION: 98 %

## 2024-06-20 DIAGNOSIS — E78.5 HYPERLIPIDEMIA, UNSPECIFIED HYPERLIPIDEMIA TYPE: ICD-10-CM

## 2024-06-20 DIAGNOSIS — M47.816 SPONDYLOSIS OF LUMBAR REGION WITHOUT MYELOPATHY OR RADICULOPATHY: ICD-10-CM

## 2024-06-20 DIAGNOSIS — Z00.00 MEDICARE ANNUAL WELLNESS VISIT, SUBSEQUENT: ICD-10-CM

## 2024-06-20 DIAGNOSIS — B35.1 ONYCHOMYCOSIS OF RIGHT GREAT TOE: ICD-10-CM

## 2024-06-20 DIAGNOSIS — F17.218 CIGARETTE NICOTINE DEPENDENCE WITH OTHER NICOTINE-INDUCED DISORDER: ICD-10-CM

## 2024-06-20 DIAGNOSIS — E55.9 VITAMIN D DEFICIENCY: ICD-10-CM

## 2024-06-20 DIAGNOSIS — J43.9 PULMONARY EMPHYSEMA, UNSPECIFIED EMPHYSEMA TYPE (MULTI): ICD-10-CM

## 2024-06-20 DIAGNOSIS — M81.0 OSTEOPOROSIS, SENILE: ICD-10-CM

## 2024-06-20 DIAGNOSIS — Z02.83 ENCOUNTER FOR DRUG SCREENING: ICD-10-CM

## 2024-06-20 DIAGNOSIS — I10 HYPERTENSION, UNSPECIFIED TYPE: Primary | ICD-10-CM

## 2024-06-20 DIAGNOSIS — R30.0 DYSURIA: ICD-10-CM

## 2024-06-20 DIAGNOSIS — Z51.81 ENCOUNTER FOR THERAPEUTIC DRUG LEVEL MONITORING: ICD-10-CM

## 2024-06-20 DIAGNOSIS — F41.1 GAD (GENERALIZED ANXIETY DISORDER): ICD-10-CM

## 2024-06-20 PROBLEM — E46 PROTEIN-CALORIE MALNUTRITION, UNSPECIFIED SEVERITY (MULTI): Status: RESOLVED | Noted: 2024-02-16 | Resolved: 2024-06-20

## 2024-06-20 LAB
1OH-MIDAZOLAM UR CFM-MCNC: <25 NG/ML
6MAM UR CFM-MCNC: <25 NG/ML
7AMINOCLONAZEPAM UR CFM-MCNC: <25 NG/ML
A-OH ALPRAZ UR CFM-MCNC: 339 NG/ML
ALPRAZ UR CFM-MCNC: 120 NG/ML
CHLORDIAZEP UR CFM-MCNC: <25 NG/ML
CLONAZEPAM UR CFM-MCNC: <25 NG/ML
CODEINE UR CFM-MCNC: <50 NG/ML
DIAZEPAM UR CFM-MCNC: <25 NG/ML
EDDP UR CFM-MCNC: <25 NG/ML
FENTANYL UR CFM-MCNC: <2.5 NG/ML
HYDROCODONE CTO UR CFM-MCNC: <25 NG/ML
HYDROMORPHONE UR CFM-MCNC: <25 NG/ML
LORAZEPAM UR CFM-MCNC: <25 NG/ML
METHADONE UR CFM-MCNC: <25 NG/ML
MIDAZOLAM UR CFM-MCNC: <25 NG/ML
MORPHINE UR CFM-MCNC: <50 NG/ML
NORDIAZEPAM UR CFM-MCNC: <25 NG/ML
NORFENTANYL UR CFM-MCNC: <2.5 NG/ML
NORHYDROCODONE UR CFM-MCNC: <25 NG/ML
NOROXYCODONE UR CFM-MCNC: <25 NG/ML
NORTRAMADOL UR-MCNC: <50 NG/ML
OXAZEPAM UR CFM-MCNC: <25 NG/ML
OXYCODONE UR CFM-MCNC: <25 NG/ML
OXYMORPHONE UR CFM-MCNC: <25 NG/ML
TEMAZEPAM UR CFM-MCNC: <25 NG/ML
TRAMADOL UR CFM-MCNC: <50 NG/ML
ZOLPIDEM UR CFM-MCNC: <25 NG/ML
ZOLPIDEM UR-MCNC: <25 NG/ML

## 2024-06-20 RX ORDER — VALSARTAN AND HYDROCHLOROTHIAZIDE 160; 12.5 MG/1; MG/1
1 TABLET, FILM COATED ORAL DAILY
Qty: 90 TABLET | Refills: 2 | Status: SHIPPED | OUTPATIENT
Start: 2024-06-20

## 2024-06-20 RX ORDER — TERBINAFINE HYDROCHLORIDE 250 MG/1
250 TABLET ORAL DAILY
Qty: 90 TABLET | Refills: 0 | Status: SHIPPED | OUTPATIENT
Start: 2024-06-20 | End: 2024-09-18

## 2024-06-20 RX ORDER — ATORVASTATIN CALCIUM 10 MG/1
10 TABLET, FILM COATED ORAL DAILY
Qty: 90 TABLET | Refills: 2 | Status: SHIPPED | OUTPATIENT
Start: 2024-06-20

## 2024-06-20 RX ORDER — ALENDRONATE SODIUM 70 MG/1
70 TABLET ORAL
Qty: 12 TABLET | Refills: 2 | Status: SHIPPED | OUTPATIENT
Start: 2024-06-20 | End: 2025-03-17

## 2024-06-20 RX ORDER — ALPRAZOLAM 0.5 MG/1
0.5 TABLET ORAL 2 TIMES DAILY PRN
Qty: 40 TABLET | Refills: 2 | Status: SHIPPED | OUTPATIENT
Start: 2024-07-16

## 2024-06-20 RX ORDER — CYCLOBENZAPRINE HCL 5 MG
5 TABLET ORAL NIGHTLY PRN
Qty: 30 TABLET | Refills: 0 | Status: SHIPPED | OUTPATIENT
Start: 2024-06-20 | End: 2024-08-19

## 2024-06-20 ASSESSMENT — PATIENT HEALTH QUESTIONNAIRE - PHQ9
SUM OF ALL RESPONSES TO PHQ9 QUESTIONS 1 AND 2: 0
1. LITTLE INTEREST OR PLEASURE IN DOING THINGS: NOT AT ALL
2. FEELING DOWN, DEPRESSED OR HOPELESS: NOT AT ALL

## 2024-06-20 ASSESSMENT — ACTIVITIES OF DAILY LIVING (ADL)
DOING_HOUSEWORK: INDEPENDENT
GROCERY_SHOPPING: INDEPENDENT
TAKING_MEDICATION: INDEPENDENT
BATHING: INDEPENDENT
DRESSING: INDEPENDENT
MANAGING_FINANCES: INDEPENDENT

## 2024-06-20 NOTE — PROGRESS NOTES
Annual Comprehensive Medical Exam    74 y.o. female presents for annual comprehensive medical evaluation and preventive services screening.  No recent hospitalizations, surgeries or significant injuries.    History of Present Illness    Osteoporosis: DEXA scan 2023 with osteoporosis of hips.  Is taking Fosamax weekly without adverse effects.  Has also increased calcium and vitamin D supplements.    Chronic intermittent lower back pain and arthritis: Occasional muscle spasm after exercise - would like Flexeril Rx    Actinic keratosis multiple sites.  Monitored by Dermatologist.  Multiple biopsies.    GYN does annual well woman exams.    COPD: Patient continues to smoke and is not interested in quitting at this time.    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 Easton.    Compliant with medications for osteoporosis, hyperlipidemia and hypertension.    Anxiety is managed with Xanax once daily and occasionally twice daily.  She uses approximately 40 tablets/month.  Her last dose was last night.    Past Medical History:   Diagnosis Date    Actinic keratoses 06/25/2023    Formatting of this note might be different from the original. UPPER CHEST LEFT OF MIDLINE, RIGHT MEDIAL CHEEK    Acute bacterial bronchitis 03/02/2024    Arthralgia of hip 03/02/2024    Basal cell carcinoma of skin, unspecified     Skin cancer, basal cell    Other conditions influencing health status     Normal cardiac stress test    Personal history of diseases of the skin and subcutaneous tissue 09/15/2014    History of rosacea    Personal history of other diseases of the musculoskeletal system and connective tissue     History of osteoarthritis    Personal history of other medical treatment     H/O bone density study    Personal history of other medical treatment     History of mammogram    Personal history of other medical treatment     History of Papanicolaou smear of cervix    Pneumonia, unspecified organism 10/15/2018     Pneumonia, primary atypical    Somatic dysfunction of lumbosacral region 02/10/2023    Squamous cell carcinoma, arm 2023    Formatting of this note might be different from the original. LEFT UPPER ARM    Unintended weight loss 2024      Past Surgical History:   Procedure Laterality Date    APPENDECTOMY  09/15/2014    Appendectomy    CATARACT EXTRACTION EXTRACAPSULAR W/ INTRAOCULAR LENS IMPLANTATION Bilateral 2023     SECTION, CLASSIC  09/15/2014     Section    COLONOSCOPY  2016    Complete Colonoscopy    CT ANGIO NECK  2022    CT NECK ANGIO W AND WO IV CONTRAST 3/7/2022 [facility] ANCILLARY LEGACY    CT HEAD ANGIO W AND WO IV CONTRAST  2022    CT HEAD ANGIO W AND WO IV CONTRAST 3/7/2022 [facility] ANCILLARY LEGACY    OTHER SURGICAL HISTORY  09/15/2014    Endarterectomy Common Carotid    OTHER SURGICAL HISTORY  2018    Carotid thromboendarterectomy    OTHER SURGICAL HISTORY  2017    Mohs Micrographic Surgery Nose     Family History   Problem Relation Name Age of Onset    Other (worker's pneumoconiosis) Father          's    Fibromyalgia Sister        Social History     Socioeconomic History    Marital status:      Spouse name: Not on file    Number of children: Not on file    Years of education: Not on file    Highest education level: Not on file   Occupational History    Not on file   Tobacco Use    Smoking status: Every Day     Current packs/day: 1.00     Average packs/day: 1 pack/day for 55.0 years (55.0 ttl pk-yrs)     Types: Cigarettes    Smokeless tobacco: Never   Substance and Sexual Activity    Alcohol use: Not Currently    Drug use: Never    Sexual activity: Not on file   Other Topics Concern    Not on file   Social History Narrative    Not on file     Social Determinants of Health     Financial Resource Strain: Not on file   Food Insecurity: Not on file   Transportation Needs: Not on file   Physical Activity: Not on file   Stress: Not on  "file   Social Connections: Not on file   Intimate Partner Violence: Not on file   Housing Stability: Not on file       Current Outpatient Medications on File Prior to Visit   Medication Sig Dispense Refill    alendronate (Fosamax) 70 mg tablet Take 1 tablet (70 mg) by mouth every 7 days. Take in the morning with a full glass of water, on an empty stomach, and do not take anything else by mouth or lie down for the next 30 min. 12 tablet 2    ALPRAZolam (Xanax) 0.5 mg tablet Take 1 tablet (0.5 mg) by mouth 2 times a day as needed for anxiety. Do not start before April 11, 2024. 40 tablet 2    aspirin 81 mg EC tablet Take 1 tablet (81 mg) by mouth once daily. 30 tablet 11    atorvastatin (Lipitor) 10 mg tablet Take 1 tablet (10 mg) by mouth once daily. 90 tablet 2    calcium carbonate-vitamin D3 500 mg-5 mcg (200 unit) tablet Take 2 tablets by mouth once daily.      multivitamin-min-iron-FA-vit K (Adults Multivitamin) 18 mg iron-400 mcg-25 mcg tablet Take 1 tablet by mouth once daily.      valsartan-hydrochlorothiazide (Diovan-HCT) 160-12.5 mg tablet Take 1 tablet by mouth once daily. 90 tablet 2     No current facility-administered medications on file prior to visit.       Allergies   Allergen Reactions    Paroxetine Other    Paxil [Paroxetine Hcl] Other     Blurred vision    Zoloft [Sertraline] Blurred vision       Complete review of systems is negative today except for that mentioned in the history of present illness.  In particular patient denies chest pain, shortness of breath, headaches and GI disturbances.      Visit Vitals  /62   Pulse 70   Ht 1.575 m (5' 2\")   Wt 49 kg (108 lb)   LMP 07/01/1991   SpO2 98%   BMI 19.75 kg/m²   OB Status Postmenopausal   Smoking Status Every Day   BSA 1.46 m²      Physical Exam  Gen.: Alert and oriented ×3 female in no acute distress.  HEENT: Head is normocephalic.  Pupils equal and reactive to light.  Tympanic membranes are clear.  Pharynx is clear.  Neck is supple without " adenopathy.  Significant carotid bruits bilaterally.  No masses or thyromegaly  Heart: Regular rate and rhythm without murmurs.  Lungs: Clear to auscultation bilaterally.  Somewhat prolonged expiration  Abdomen: Soft with normal bowel sounds.  No masses or pain to palpation.  No bruits auscultated.  Extremities: Good range of motion of all joints.  No significant edema. Pedal pulses +1-2/4  Skin: No significant or irregular nevi visualized.  Significant onychomycosis of the right great toenail.  Patient is interested in treatment  Neuro: No signs of focal neurologic deficit.  No tremor.  Speech and hearing are normal.  DTRs +3/4;  Muscle Strength +5/5.  Musculoskeletal: Spine with good ROM.  No scoliosis.  Leg lengths are equal.  Psych: normal affect.  No suicidal ideation.  Good judgement and insight.     Lab reviewed in detail with patient.  She denies UTI symptoms.    The 10-year ASCVD risk score (Juani FORREST, et al., 2019) is: 27.3%    Values used to calculate the score:      Age: 74 years      Sex: Female      Is Non- : No      Diabetic: No      Tobacco smoker: Yes      Systolic Blood Pressure: 120 mmHg      Is BP treated: Yes      HDL Cholesterol: 34.9 mg/dL      Total Cholesterol: 142 mg/dL  I discussed face-to-face with this individual discussing their cardiovascular risk and behavioral therapies of nutritional choices, exercise and elimination of habits contributing to risk.  We agreed on a plan how they may be able to reduce their current cardiovascular risk.  For patients with risk calculation greater than 10%, aspirin use was discussed and encouraged unless known allergy or increased risk of bleeding contraindicate use.  15 minutes.    Diagnosis/Plan  1. Hypertension, unspecified type  - Comprehensive Metabolic Panel; Future  - Lipid Panel; Future  - TSH with reflex to Free T4 if abnormal; Future  - ECG 12 Lead  - valsartan-hydrochlorothiazide (Diovan-HCT) 160-12.5 mg tablet; Take 1  tablet by mouth once daily.  Dispense: 90 tablet; Refill: 2    2. Hyperlipidemia, unspecified hyperlipidemia type  - Lipid Panel; Future  - TSH with reflex to Free T4 if abnormal; Future  - atorvastatin (Lipitor) 10 mg tablet; Take 1 tablet (10 mg) by mouth once daily.  Dispense: 90 tablet; Refill: 2    3. KB (generalized anxiety disorder)  - ALPRAZolam (Xanax) 0.5 mg tablet; Take 1 tablet (0.5 mg) by mouth 2 times a day as needed for anxiety. Do not fill before July 16, 2024.  Dispense: 40 tablet; Refill: 2    4. Osteoporosis, senile  - alendronate (Fosamax) 70 mg tablet; Take 1 tablet (70 mg) by mouth every 7 days. Take in the morning with a full glass of water, on an empty stomach, and do not take anything else by mouth or lie down for the next 30 min.  Dispense: 12 tablet; Refill: 2    5. Pulmonary emphysema, unspecified emphysema type (Multi)    6. Cigarette nicotine dependence with other nicotine-induced disorder  Tobacco use: Patient encouraged to stop smoking.  The patient is aware of the detrimental effects of long-term smoking on overall health and life expectancy.  Call 0-800QUIT-NOW for additional stop smoking counselling free of charge.    Over the past several years, I have counseled the patient about smoking/tobacco cessation and how I can support efforts when patient is ready to quit.  I have discussed nicotine replacement therapy, Chantix, Wellbutrin, hypnosis, support groups and acupuncture as potential options.  Patient currently has no signs or symptoms of tobacco related disease.  If interested in hypnotism, ask for referral to [facility] or call [employer] in [place] at [phone]    7. Spondylosis of lumbar region without myelopathy or radiculopathy  - cyclobenzaprine (Flexeril) 5 mg tablet; Take 1 tablet (5 mg) by mouth as needed at bedtime for muscle spasms.  Dispense: 30 tablet; Refill: 0    9. Onychomycosis of right great toe  - terbinafine (LamISIL)  250 mg tablet; Take 1 tablet (250 mg) by mouth once daily.  Dispense: 90 tablet; Refill: 0  Anticipate new nail growth complete at 9 to 12 months.    10. Vitamin D deficiency  - CBC; Future  - Vitamin D 25-Hydroxy,Total (for eval of Vitamin D levels); Future    11. Medicare annual wellness visit, subsequent  Living Will / Advanced Care Planning: I spent more than 15 minutes discussing advance care planning including explanation and discussion of advanced directives.  If patient does not have current up-to-date documents, examples and information were provided on how to create both living will and power of .  Patient was encouraged to work on completing these documents.        12. Dysuria  - Urinalysis with Reflex Microscopic; Future    13. Encounter for therapeutic drug level monitoring  - Opiate/Opioid/Benzo Prescription Compliance; Future    14. Encounter for drug screening  - Opiate/Opioid/Benzo Prescription Compliance; Future          follow up in 3 months for refill of Xanax  Follow-up in 6 months for annual comprehensive medical evaluation    I will continue to monitor, evaluate, assess and treat all problems/diagnoses as appropriate and continue to collaborate with specialists.    Contact office or send a  MY Chart message with any questions or concerns    Encouraged to sign up with my  My Chart  Patient will only be notified of labs that require medical intervention.    Prescriptions will not be filled unless you are compliant with your follow up appointments or have a follow up appointment scheduled as per instruction of your physician. Refills should be requested at the time of your visit.    **Charting was completed using voice recognition technology and may include unintended errors**    Caio Broderick DO, FACOFP  [id number]  [place] [id number] [phone]    Caio Broderick DO, EMILYP

## 2024-09-19 ENCOUNTER — APPOINTMENT (OUTPATIENT)
Dept: PRIMARY CARE | Facility: CLINIC | Age: 74
End: 2024-09-19
Payer: COMMERCIAL

## 2024-09-23 ENCOUNTER — HOSPITAL ENCOUNTER (OUTPATIENT)
Dept: CARDIOLOGY | Facility: HOSPITAL | Age: 74
Discharge: HOME | End: 2024-09-23
Payer: MEDICARE

## 2024-09-23 DIAGNOSIS — I66.03 OCCLUSION AND STENOSIS OF BILATERAL MIDDLE CEREBRAL ARTERIES: ICD-10-CM

## 2024-09-23 DIAGNOSIS — R09.89 OTHER SPECIFIED SYMPTOMS AND SIGNS INVOLVING THE CIRCULATORY AND RESPIRATORY SYSTEMS: ICD-10-CM

## 2024-09-23 DIAGNOSIS — I65.29 CAROTID STENOSIS: ICD-10-CM

## 2024-09-23 PROCEDURE — 93880 EXTRACRANIAL BILAT STUDY: CPT | Performed by: SURGERY

## 2024-09-23 PROCEDURE — 93880 EXTRACRANIAL BILAT STUDY: CPT

## 2024-09-24 ENCOUNTER — APPOINTMENT (OUTPATIENT)
Dept: PRIMARY CARE | Facility: CLINIC | Age: 74
End: 2024-09-24
Payer: MEDICARE

## 2024-09-24 VITALS
HEIGHT: 62 IN | WEIGHT: 109 LBS | SYSTOLIC BLOOD PRESSURE: 144 MMHG | DIASTOLIC BLOOD PRESSURE: 68 MMHG | BODY MASS INDEX: 20.06 KG/M2

## 2024-09-24 DIAGNOSIS — F41.1 GAD (GENERALIZED ANXIETY DISORDER): Primary | ICD-10-CM

## 2024-09-24 PROCEDURE — 1159F MED LIST DOCD IN RCRD: CPT | Performed by: FAMILY MEDICINE

## 2024-09-24 PROCEDURE — 3078F DIAST BP <80 MM HG: CPT | Performed by: FAMILY MEDICINE

## 2024-09-24 PROCEDURE — 3077F SYST BP >= 140 MM HG: CPT | Performed by: FAMILY MEDICINE

## 2024-09-24 PROCEDURE — 99213 OFFICE O/P EST LOW 20 MIN: CPT | Performed by: FAMILY MEDICINE

## 2024-09-24 PROCEDURE — 1123F ACP DISCUSS/DSCN MKR DOCD: CPT | Performed by: FAMILY MEDICINE

## 2024-09-24 RX ORDER — ALPRAZOLAM 0.5 MG/1
0.5 TABLET ORAL 2 TIMES DAILY PRN
Qty: 40 TABLET | Refills: 2 | Status: SHIPPED | OUTPATIENT
Start: 2024-09-24

## 2024-09-24 NOTE — PROGRESS NOTES
HPI 74 y.o. female presents for evaluation and refill of controlled substance.      Patient continues to use Xanax 0.5 mg approximately twice daily.  She gets good relief from anxiety.  She was not able to tolerate or other SSRIs were ineffective.    Her last refill of 40 tablets was .  She recently had Mohs surgery for skin cancer removal on her left forearm and right hand and states that she had to use Xanax twice a day on multiple days related to the surgery.  Her last dose was last night.    Carotid artery stenosis.  Patient has follow-up with vascular surgeon this week.    Cigarette smoker not ready to quit yet    Past Medical History:   Diagnosis Date    Actinic keratoses 2023    Formatting of this note might be different from the original. UPPER CHEST LEFT OF MIDLINE, RIGHT MEDIAL CHEEK    Arthralgia of hip 2024    Basal cell carcinoma of skin, unspecified     Skin cancer, basal cell    COPD (chronic obstructive pulmonary disease) (Multi) 02/10/2023    KB (generalized anxiety disorder) 02/10/2023    Hyperlipidemia 02/10/2023    Hypertension 02/10/2023    Pneumonia, unspecified organism 10/15/2018    Pneumonia, primary atypical    Protein-calorie malnutrition, unspecified severity (Multi) 2024    Somatic dysfunction of lumbosacral region 02/10/2023    Squamous cell carcinoma, arm 2023    Formatting of this note might be different from the original. LEFT UPPER ARM    Unintended weight loss 2024      Past Surgical History:   Procedure Laterality Date    APPENDECTOMY  09/15/2014    Appendectomy    CATARACT EXTRACTION EXTRACAPSULAR W/ INTRAOCULAR LENS IMPLANTATION Bilateral 2023     SECTION, CLASSIC  09/15/2014     Section    COLONOSCOPY  2016    Complete Colonoscopy    CT ANGIO NECK  2022    CT NECK ANGIO W AND WO IV CONTRAST 3/7/2022 [facility] ANCILLARY LEGACY    CT HEAD ANGIO W AND WO IV CONTRAST  2022    CT HEAD ANGIO W AND WO IV CONTRAST  3/7/2022 [facility] ANCILLARY LEGACY    OTHER SURGICAL HISTORY  09/15/2014    Endarterectomy Common Carotid    OTHER SURGICAL HISTORY  11/08/2018    Carotid thromboendarterectomy    OTHER SURGICAL HISTORY  04/17/2017    Mohs Micrographic Surgery Nose     Family History   Problem Relation Name Age of Onset    Other (worker's pneumoconiosis) Father          's    Fibromyalgia Sister        Social History     Socioeconomic History    Marital status:      Spouse name: Not on file    Number of children: Not on file    Years of education: Not on file    Highest education level: Not on file   Occupational History    Not on file   Tobacco Use    Smoking status: Every Day     Current packs/day: 1.00     Average packs/day: 1 pack/day for 55.0 years (55.0 ttl pk-yrs)     Types: Cigarettes    Smokeless tobacco: Never   Substance and Sexual Activity    Alcohol use: Not Currently    Drug use: Yes     Types: Benzodiazepines    Sexual activity: Not on file   Other Topics Concern    Not on file   Social History Narrative    Not on file     Social Determinants of Health     Financial Resource Strain: Not on file   Food Insecurity: Not on file   Transportation Needs: Not on file   Physical Activity: Not on file   Stress: Not on file   Social Connections: Not on file   Intimate Partner Violence: Not on file   Housing Stability: Not on file       Current Outpatient Medications on File Prior to Visit   Medication Sig Dispense Refill    alendronate (Fosamax) 70 mg tablet Take 1 tablet (70 mg) by mouth every 7 days. Take in the morning with a full glass of water, on an empty stomach, and do not take anything else by mouth or lie down for the next 30 min. 12 tablet 2    ALPRAZolam (Xanax) 0.5 mg tablet Take 1 tablet (0.5 mg) by mouth 2 times a day as needed for anxiety. Do not fill before July 16, 2024. 40 tablet 2    aspirin 81 mg EC tablet Take 1 tablet (81 mg) by mouth once daily. 30 tablet 11    atorvastatin (Lipitor) 10 mg  "tablet Take 1 tablet (10 mg) by mouth once daily. 90 tablet 2    calcium carbonate-vitamin D3 500 mg-5 mcg (200 unit) tablet Take 2 tablets by mouth once daily.      multivitamin-min-iron-FA-vit K (Adults Multivitamin) 18 mg iron-400 mcg-25 mcg tablet Take 1 tablet by mouth once daily.      valsartan-hydrochlorothiazide (Diovan-HCT) 160-12.5 mg tablet Take 1 tablet by mouth once daily. 90 tablet 2    [] terbinafine (LamISIL) 250 mg tablet Take 1 tablet (250 mg) by mouth once daily. 90 tablet 0     No current facility-administered medications on file prior to visit.       Allergies   Allergen Reactions    Paroxetine Other    Paxil [Paroxetine Hcl] Other     Blurred vision    Zoloft [Sertraline] Blurred vision       Visit Vitals  /68   Ht 1.575 m (5' 2\")   Wt 49.4 kg (109 lb)   LMP 1991   BMI 19.94 kg/m²   OB Status Postmenopausal   Smoking Status Every Day   BSA 1.47 m²        EXAM:  Alert and oriented ×3.  No acute distress.  No tremors noted.  Gait is normal.  Mood and affect are normal.     Assessment/Diagnosis    1. KB (generalized anxiety disorder)  - ALPRAZolam (Xanax) 0.5 mg tablet; Take 1 tablet (0.5 mg) by mouth 2 times a day as needed for anxiety.  Dispense: 40 tablet; Refill: 2      Plan    [place] reviewed.  Controlled Substance Agreement is current.  Urine drug screen up to date.    CONTROLLED SUBSTANCE USE:   Patient is aware of Dr. Broderick's and Ashlyn Hazel's practice rules for use of scheduled medication.  Has a signed contract stating that patient will only receive controlled substance prescriptions from Dr. Broderick, will only receive a one month supply, will fill prescriptions at one pharmacy, and agrees to a random urine drug screen.  Patient is aware that she must have an office appointment every 90 days to continue to receive benzodiazepines or narcotics.        Follow up in in December as scheduled for medical management    I will continue to monitor, evaluate, assess and treat all " problems/diagnoses as appropriate and continue to collaborate with specialists.    Contact office or send a  [email or web address] message with any questions or concerns    Patient will only be notified of labs that require medical intervention.    Prescriptions will not be filled unless you are compliant with your follow up appointments or have a follow up appointment scheduled as per instruction of your physician. Refills should be requested at the time of your visit.    **Charting was completed using voice recognition technology and may include unintended errors**    Caio Broderick DO, FACOFP  Senior Attending Physician  [facility]  [address]  [place] [address] [phone]

## 2024-09-25 ENCOUNTER — APPOINTMENT (OUTPATIENT)
Dept: VASCULAR SURGERY | Facility: CLINIC | Age: 74
End: 2024-09-25
Payer: MEDICARE

## 2024-09-25 ENCOUNTER — LAB (OUTPATIENT)
Dept: LAB | Facility: LAB | Age: 74
End: 2024-09-25
Payer: MEDICARE

## 2024-09-25 VITALS
HEIGHT: 62 IN | OXYGEN SATURATION: 96 % | BODY MASS INDEX: 20.06 KG/M2 | SYSTOLIC BLOOD PRESSURE: 130 MMHG | DIASTOLIC BLOOD PRESSURE: 70 MMHG | WEIGHT: 109 LBS | HEART RATE: 86 BPM

## 2024-09-25 DIAGNOSIS — I65.21 STENOSIS OF RIGHT CAROTID ARTERY: Primary | ICD-10-CM

## 2024-09-25 DIAGNOSIS — I65.21 STENOSIS OF RIGHT CAROTID ARTERY: ICD-10-CM

## 2024-09-25 DIAGNOSIS — I65.23 CAROTID STENOSIS, ASYMPTOMATIC, BILATERAL: ICD-10-CM

## 2024-09-25 LAB
ALBUMIN SERPL BCP-MCNC: 4 G/DL (ref 3.4–5)
ANION GAP SERPL CALC-SCNC: 13 MMOL/L (ref 10–20)
BUN SERPL-MCNC: 17 MG/DL (ref 6–23)
CALCIUM SERPL-MCNC: 9.5 MG/DL (ref 8.6–10.6)
CHLORIDE SERPL-SCNC: 103 MMOL/L (ref 98–107)
CO2 SERPL-SCNC: 26 MMOL/L (ref 21–32)
CREAT SERPL-MCNC: 0.61 MG/DL (ref 0.5–1.05)
EGFRCR SERPLBLD CKD-EPI 2021: >90 ML/MIN/1.73M*2
GLUCOSE SERPL-MCNC: 103 MG/DL (ref 74–99)
PHOSPHATE SERPL-MCNC: 3.7 MG/DL (ref 2.5–4.9)
POTASSIUM SERPL-SCNC: 3.9 MMOL/L (ref 3.5–5.3)
SODIUM SERPL-SCNC: 138 MMOL/L (ref 136–145)

## 2024-09-25 PROCEDURE — 80069 RENAL FUNCTION PANEL: CPT

## 2024-09-25 PROCEDURE — 1123F ACP DISCUSS/DSCN MKR DOCD: CPT | Performed by: SURGERY

## 2024-09-25 PROCEDURE — 1160F RVW MEDS BY RX/DR IN RCRD: CPT | Performed by: SURGERY

## 2024-09-25 PROCEDURE — 36415 COLL VENOUS BLD VENIPUNCTURE: CPT

## 2024-09-25 PROCEDURE — 99214 OFFICE O/P EST MOD 30 MIN: CPT | Performed by: SURGERY

## 2024-09-25 PROCEDURE — 1159F MED LIST DOCD IN RCRD: CPT | Performed by: SURGERY

## 2024-09-25 PROCEDURE — 3075F SYST BP GE 130 - 139MM HG: CPT | Performed by: SURGERY

## 2024-09-25 PROCEDURE — 3078F DIAST BP <80 MM HG: CPT | Performed by: SURGERY

## 2024-09-25 NOTE — PROGRESS NOTES
History Of Present Illness  Laila Gonzales is a 74 y.o. female presenting for carotid follow up. She has h/o right CEA. She denies any lateralizing symptoms since her last visit.  She continues to take aspirin and atorvastatin daily.  Recent carotid duplex reveals less than 50% ICA stenosis bilaterally.  However there is a high-grade stenosis in the distal right common carotid artery with a peak systolic velocity of 456 cm/s.     Past Medical History  She has a past medical history of Actinic keratoses (2023), Arthralgia of hip (2024), Basal cell carcinoma of skin, unspecified, COPD (chronic obstructive pulmonary disease) (Multi) (02/10/2023), KB (generalized anxiety disorder) (02/10/2023), Hyperlipidemia (02/10/2023), Hypertension (02/10/2023), Pneumonia, unspecified organism (10/15/2018), Protein-calorie malnutrition, unspecified severity (Multi) (2024), Somatic dysfunction of lumbosacral region (02/10/2023), Squamous cell carcinoma, arm (2023), and Unintended weight loss (2024).    Surgical History  She has a past surgical history that includes  section, classic (09/15/2014); Appendectomy (09/15/2014); Other surgical history (09/15/2014); Other surgical history (2018); Other surgical history (2017); CT angio head w and wo IV contrast (2022); CT angio neck (2022); Colonoscopy (2016); and Cataract extraction, extracapsular w/ intraocular lens implant (Bilateral, 2023).     Social History  She reports that she has been smoking cigarettes. She has a 55 pack-year smoking history. She has never used smokeless tobacco. She reports that she does not currently use alcohol. She reports current drug use. Drug: Benzodiazepines.    Family History  Family History   Problem Relation Name Age of Onset    Other (worker's pneumoconiosis) Father          's    Fibromyalgia Sister          Allergies  Paroxetine, Paxil [paroxetine hcl], and Zoloft  "[sertraline]    Review of Systems     Physical Exam  Vitals reviewed.   Constitutional:       General: She is not in acute distress.     Appearance: Normal appearance. She is normal weight.   HENT:      Head: Normocephalic and atraumatic.   Eyes:      Extraocular Movements: Extraocular movements intact.      Conjunctiva/sclera: Conjunctivae normal.   Neck:      Vascular: No carotid bruit.   Cardiovascular:      Rate and Rhythm: Normal rate and regular rhythm.      Pulses: Normal pulses.           Radial pulses are 2+ on the right side and 2+ on the left side.      Heart sounds: Normal heart sounds.   Pulmonary:      Effort: Pulmonary effort is normal.      Breath sounds: Normal breath sounds.   Abdominal:      General: Abdomen is flat.      Palpations: Abdomen is soft.   Musculoskeletal:         General: No swelling. Normal range of motion.      Cervical back: Normal range of motion. No tenderness.   Skin:     General: Skin is warm and dry.   Neurological:      General: No focal deficit present.      Mental Status: She is alert and oriented to person, place, and time.      Cranial Nerves: Cranial nerves 2-12 are intact. No cranial nerve deficit.      Sensory: No sensory deficit.      Motor: Motor function is intact. No weakness.   Psychiatric:         Mood and Affect: Mood normal.         Behavior: Behavior normal.          Last Recorded Vitals  Blood pressure 130/70, pulse 86, height 1.575 m (5' 2\"), weight 49.4 kg (109 lb), last menstrual period 07/01/1991, SpO2 96%.    Relevant Results      Current Outpatient Medications:     alendronate (Fosamax) 70 mg tablet, Take 1 tablet (70 mg) by mouth every 7 days. Take in the morning with a full glass of water, on an empty stomach, and do not take anything else by mouth or lie down for the next 30 min., Disp: 12 tablet, Rfl: 2    ALPRAZolam (Xanax) 0.5 mg tablet, Take 1 tablet (0.5 mg) by mouth 2 times a day as needed for anxiety., Disp: 40 tablet, Rfl: 2    aspirin 81 mg " EC tablet, Take 1 tablet (81 mg) by mouth once daily., Disp: 30 tablet, Rfl: 11    atorvastatin (Lipitor) 10 mg tablet, Take 1 tablet (10 mg) by mouth once daily., Disp: 90 tablet, Rfl: 2    calcium carbonate-vitamin D3 500 mg-5 mcg (200 unit) tablet, Take 2 tablets by mouth once daily., Disp: , Rfl:     multivitamin-min-iron-FA-vit K (Adults Multivitamin) 18 mg iron-400 mcg-25 mcg tablet, Take 1 tablet by mouth once daily., Disp: , Rfl:     valsartan-hydrochlorothiazide (Diovan-HCT) 160-12.5 mg tablet, Take 1 tablet by mouth once daily., Disp: 90 tablet, Rfl: 2       Vascular US Carotid Artery Duplex Bilateral    Result Date: 9/23/2024            [facility] [address] [place] [address]     Tel [phone] Fax [phone]  Vascular Lab Report  VASC US CAROTID ARTERY DUPLEX BILATERAL Patient Name:     SANDEEP FRANCIS         Reading           [id number] Luis Gorman                                       Physician:        MD Study Date:       9/23/2024           Ordering          [id number] KISHA ABEBE                                       Provider: MRN/PID:          [id number]            Fellow: Accession#:       [id number]        Technologist:     Abigail Knowles RVT Date of           1950 / 74      Technologist 2: Birth/Age:        years Gender:           F                   Encounter#:       [id number] Admission Status: Outpatient          Location          [facility]                                       Performed:  Diagnosis/ICD: Other specified symptoms and signs involving the circulatory and                respiratory systems-R09.89 Indication:    Carotid Occlusion/Stenosis w/o infarct CPT Codes:     37935 Cerebrovascular Carotid Duplex scan complete  Pertinent History: Hx right CEA.  **CRITICAL RESULT** Critical Result: >50% right distal CCA Notification called to Dr. Abebe on 9/23/2024 at 10:33:43 AM by Abigail Knowles RVT.  CONCLUSIONS: Right Carotid: Findings  are consistent with less than 50% stenosis of the right proximal internal carotid artery. Laminar flow seen by color Doppler. There are elevated velocities in the right ECA that are suggestive of disease. There is a >50% stenosis noted in the right common carotid artery. Stenosis is seen at the distal CCA. The right vertebral artery is patent with antegrade flow. No evidence of hemodynamically significant stenosis in the right subclavian artery. Left Carotid: Findings are consistent with less than 50% stenosis of the left proximal internal carotid artery. Laminar flow seen by color Doppler. Left external carotid artery appears patent with no evidence of stenosis. No evidence of hemodynamically significant stenosis of the left common carotid artery. The left vertebral artery is patent with antegrade flow. No evidence of hemodynamically significant stenosis in the left subclavian artery. Endarterectomy: Patent right carotid endarterectomy site following endarterectomy.  Comparison: Compared with study from 9/15/2023, Previous exam states <50% bilateral ICA's. On today's exam, there is a stenosis seen in the right common carotid artery at distal.  Imaging & Doppler Findings: Right Plaque Morph: The proximal right internal carotid artery demonstrates calcified plaque. The proximal right external carotid artery demonstrates heterogenous plaque. The proximal right common carotid artery demonstrates irregular and calcified plaque. The mid right common carotid artery demonstrates calcified and irregular plaque. The distal right common carotid artery demonstrates calcified, irregular and ulcerative plaque. Left Plaque Morph: The proximal left internal carotid artery demonstrates calcified and irregular plaque. The proximal left external carotid artery demonstrates irregular and calcified plaque.   Right                        Left   PSV      EDV                PSV      EDV 70 cm/s  24 cm/s   CCA P    94 cm/s  29 cm/s 456  cm/s           CCA D    76 cm/s  29 cm/s 67 cm/s  19 cm/s   ICA P    114 cm/s 32 cm/s 73 cm/s  38 cm/s   ICA M    94 cm/s  34 cm/s 67 cm/s  33 cm/s   ICA D    88 cm/s  33 cm/s 242 cm/s            ECA     136 cm/s 25 cm/s  7 cm/s  Vertebral  41 cm/s 90 cm/s          Subclavian 84 cm/s                Right Left ICA/CCA Ratio  0.1  1.5   [id number] Luis Gorman MD Electronically signed by [id number] Luis Gorman MD on 9/23/2024 at 8:11:52 PM  ** Final **            Assessment/Plan   Diagnoses and all orders for this visit:  Stenosis of right carotid artery  -     Renal function panel; Future      73yo female presenting for carotid follow-up.  She has history of right CEA 10 years ago.  She denies lateralizing symptoms and no focal deficits noted on exam. Recent carotid duplex reveals less than 50% ICA stenosis bilaterally.  However there is a high-grade stenosis seen in the right distal common carotid artery with peak systolic velocity of 456 cm/s.  I have recommended further evaluation with CT angiogram of the head and neck.  We will send her for the study and she is to follow-up in 1 to 2 weeks to discuss results and further recommendations.       Nupur Easton MD

## 2024-09-27 ENCOUNTER — APPOINTMENT (OUTPATIENT)
Dept: VASCULAR SURGERY | Facility: CLINIC | Age: 74
End: 2024-09-27
Payer: MEDICARE

## 2024-09-27 ENCOUNTER — HOSPITAL ENCOUNTER (OUTPATIENT)
Dept: RADIOLOGY | Facility: CLINIC | Age: 74
Discharge: HOME | End: 2024-09-27
Payer: MEDICARE

## 2024-09-27 DIAGNOSIS — I65.23 CAROTID STENOSIS, ASYMPTOMATIC, BILATERAL: ICD-10-CM

## 2024-09-27 PROCEDURE — 2550000001 HC RX 255 CONTRASTS: Performed by: SURGERY

## 2024-09-27 PROCEDURE — 70498 CT ANGIOGRAPHY NECK: CPT

## 2024-10-09 ENCOUNTER — OFFICE VISIT (OUTPATIENT)
Dept: VASCULAR SURGERY | Facility: CLINIC | Age: 74
End: 2024-10-09
Payer: MEDICARE

## 2024-10-09 VITALS
OXYGEN SATURATION: 98 % | DIASTOLIC BLOOD PRESSURE: 74 MMHG | WEIGHT: 108 LBS | BODY MASS INDEX: 19.88 KG/M2 | HEIGHT: 62 IN | HEART RATE: 80 BPM | SYSTOLIC BLOOD PRESSURE: 140 MMHG

## 2024-10-09 DIAGNOSIS — I65.21 STENOSIS OF RIGHT CAROTID ARTERY: Primary | ICD-10-CM

## 2024-10-09 DIAGNOSIS — Z48.812 POSTOP CAROTID ENDARTERECTOMY SURVEILLANCE, ENCOUNTER FOR: ICD-10-CM

## 2024-10-09 PROCEDURE — 1123F ACP DISCUSS/DSCN MKR DOCD: CPT | Performed by: SURGERY

## 2024-10-09 PROCEDURE — 3077F SYST BP >= 140 MM HG: CPT | Performed by: SURGERY

## 2024-10-09 PROCEDURE — 99214 OFFICE O/P EST MOD 30 MIN: CPT | Performed by: SURGERY

## 2024-10-09 PROCEDURE — 1159F MED LIST DOCD IN RCRD: CPT | Performed by: SURGERY

## 2024-10-09 PROCEDURE — 1160F RVW MEDS BY RX/DR IN RCRD: CPT | Performed by: SURGERY

## 2024-10-09 PROCEDURE — 3078F DIAST BP <80 MM HG: CPT | Performed by: SURGERY

## 2024-10-09 RX ORDER — CLOPIDOGREL BISULFATE 75 MG/1
75 TABLET ORAL DAILY
Qty: 30 TABLET | Refills: 11 | Status: SHIPPED | OUTPATIENT
Start: 2024-10-09 | End: 2025-10-09

## 2024-10-09 NOTE — PROGRESS NOTES
History Of Present Illness  Laila Gonzales is a 74 y.o. female presenting follow-up.  She was recently seen 2 weeks ago and found to have high-grade stenosis of the right common carotid artery.  She was sent for CT angiogram of the head and neck for further evaluation.  This does reveal focal right common carotid stenosis that is unchanged since .  Patient remains asymptomatic.     Past Medical History  She has a past medical history of Actinic keratoses (2023), Arthralgia of hip (2024), Basal cell carcinoma of skin, unspecified, COPD (chronic obstructive pulmonary disease) (Multi) (02/10/2023), KB (generalized anxiety disorder) (02/10/2023), Hyperlipidemia (02/10/2023), Hypertension (02/10/2023), Pneumonia, unspecified organism (10/15/2018), Protein-calorie malnutrition, unspecified severity (Multi) (2024), Somatic dysfunction of lumbosacral region (02/10/2023), Squamous cell carcinoma, arm (2023), and Unintended weight loss (2024).    Surgical History  She has a past surgical history that includes  section, classic (09/15/2014); Appendectomy (09/15/2014); Other surgical history (09/15/2014); Other surgical history (2018); Other surgical history (2017); CT angio head w and wo IV contrast (2022); CT angio neck (2022); Colonoscopy (2016); and Cataract extraction, extracapsular w/ intraocular lens implant (Bilateral, 2023).     Social History  She reports that she has been smoking cigarettes. She has a 55 pack-year smoking history. She has never used smokeless tobacco. She reports that she does not currently use alcohol. She reports current drug use. Drug: Benzodiazepines.    Family History  Family History   Problem Relation Name Age of Onset    Other (worker's pneumoconiosis) Father          's    Fibromyalgia Sister          Allergies  Paroxetine, Paxil [paroxetine hcl], and Zoloft [sertraline]    Review of Systems     Physical  "Exam  Vitals reviewed.   Constitutional:       General: She is not in acute distress.     Appearance: Normal appearance. She is normal weight.   HENT:      Head: Normocephalic and atraumatic.   Eyes:      Extraocular Movements: Extraocular movements intact.      Conjunctiva/sclera: Conjunctivae normal.   Neck:      Vascular: No carotid bruit.   Cardiovascular:      Rate and Rhythm: Normal rate and regular rhythm.      Pulses: Normal pulses.           Radial pulses are 2+ on the right side and 2+ on the left side.      Heart sounds: Normal heart sounds.   Pulmonary:      Effort: Pulmonary effort is normal.      Breath sounds: Normal breath sounds.   Abdominal:      General: Abdomen is flat.      Palpations: Abdomen is soft.   Musculoskeletal:         General: No swelling. Normal range of motion.      Cervical back: Normal range of motion. No tenderness.   Skin:     General: Skin is warm and dry.   Neurological:      General: No focal deficit present.      Mental Status: She is alert and oriented to person, place, and time.      Cranial Nerves: Cranial nerves 2-12 are intact. No cranial nerve deficit.      Sensory: No sensory deficit.      Motor: Motor function is intact. No weakness.   Psychiatric:         Mood and Affect: Mood normal.         Behavior: Behavior normal.          Last Recorded Vitals  Blood pressure 140/74, pulse 80, height 1.575 m (5' 2\"), weight 49 kg (108 lb), last menstrual period 07/01/1991, SpO2 98%.    Relevant Results      Current Outpatient Medications:     alendronate (Fosamax) 70 mg tablet, Take 1 tablet (70 mg) by mouth every 7 days. Take in the morning with a full glass of water, on an empty stomach, and do not take anything else by mouth or lie down for the next 30 min., Disp: 12 tablet, Rfl: 2    ALPRAZolam (Xanax) 0.5 mg tablet, Take 1 tablet (0.5 mg) by mouth 2 times a day as needed for anxiety., Disp: 40 tablet, Rfl: 2    atorvastatin (Lipitor) 10 mg tablet, Take 1 tablet (10 mg) by " mouth once daily., Disp: 90 tablet, Rfl: 2    calcium carbonate-vitamin D3 500 mg-5 mcg (200 unit) tablet, Take 2 tablets by mouth once daily., Disp: , Rfl:     multivitamin-min-iron-FA-vit K (Adults Multivitamin) 18 mg iron-400 mcg-25 mcg tablet, Take 1 tablet by mouth once daily., Disp: , Rfl:     valsartan-hydrochlorothiazide (Diovan-HCT) 160-12.5 mg tablet, Take 1 tablet by mouth once daily., Disp: 90 tablet, Rfl: 2    clopidogrel (Plavix) 75 mg tablet, Take 1 tablet (75 mg) by mouth once daily., Disp: 30 tablet, Rfl: 11       CT angio head and neck w and wo IV contrast    Result Date: 9/28/2024  Interpreted By:  Belen Negrete, STUDY: CT ANGIO HEAD AND NECK W AND WO IV CONTRAST;  9/27/2024 1:49 pm   INDICATION: Signs/Symptoms:carotid  stenosis.   ,I65.23 Occlusion and stenosis of bilateral carotid arteries   COMPARISON: 03/07/2022   ACCESSION NUMBER(S): [id number]   ORDERING CLINICIAN: KISHA ABEBE   TECHNIQUE: Unenhanced CT images of the head were obtained. Subsequently, 70 ML of Omnipaque 350 was administered intravenously and axial images of the head and neck were acquired.  Coronal, sagittal, and 3-D reconstructions were provided for review.   FINDINGS:     CTA HEAD FINDINGS:   Anterior circulation: The bilateral intracranial internal carotid arteries, bilateral carotid terminals, bilateral proximal anterior and middle cerebral arteries are normal. The right A1 segment is diminutive, likely a developmental variant. There are atherosclerotic calcifications along the cavernous segments of the carotid arteries.   Unchanged 2-3 mm outpouchings at the MCA bifurcation suggesting aneurysms.   Posterior circulation: Bilateral intracranial vertebral arteries, vertebrobasilar junction, basilar artery and proximal posterior cerebral arteries are patent. The left intradural vertebral artery may terminate as PICA. The posterior cerebral arteries are predominantly supplied by robust posterior communicating arteries.    CTA NECK FINDINGS:   There is atherosclerotic calcification along the aortic arch and origins of the great vessels. Moderate narrowing at the origin of the left common carotid artery and left subclavian artery.   Right carotid vessels: The common carotid artery is patent. Along the proximal common carotid artery there is atherosclerotic calcification without significant narrowing. Within the mid common carotid artery there is a short segment of stenosis measuring a proximally 70% stenosis per NASCET criteria. The lumen measures a proximally 2.5 mm in diameter. Distal to the short segment of stenosis there is a dilated common carotid artery to the level of the carotid bifurcation with peripheral calcification.   There is soft tissue thickening along the common carotid artery which may be secondary to carotid endarterectomy.   There is calcified plaque at the carotid bifurcation without significant stenosis. The cervical internal carotid artery is patent without significant stenosis.   Left carotid vessels: The common carotid artery is patent. There are atherosclerotic calcifications along the common carotid artery. There is coarse atherosclerotic calcification at the left carotid bifurcation with a proximally 50% stenosis by NASCET criteria. The internal carotid artery in the neck is patent without significant stenosis.   Vertebral vessels:  There is coarse atherosclerotic calcification at the origin of the vertebral artery on the right. The cervical vertebral arteries are patent. The right cervical vertebral artery is dominant.   Degenerative changes of the cervical spine.       CTA neck:   No significant interval change in the appearance of the common carotid artery on the right compared to the prior exam 03/07/2022. Redemonstrated is a short segment of stenosis measuring approximately 70% within the mid common carotid artery. Distal to the stenosis there is dilatation of the common carotid artery to the level of  the bifurcation.   Coarse atherosclerotic calcification at the left carotid bifurcation with a proximally 50% stenosis by NASCET criteria.   Coarse atherosclerotic calcification at the origin of the vertebral artery on the right.   Atherosclerotic calcification at the origins of the great vessels with moderate narrowing at the origin of the left common carotid artery and left subclavian artery.   CTA head:   No evidence for significant stenosis or large branch vessel cutoffs of the intracranial vessels.   Unchanged 2-3 mm outpouchings at the MCA bifurcations suggesting aneurysms.   Atherosclerotic calcification along the cavernous segments of the carotid arteries.   MACRO: None   Signed by: Belen Negrete 9/28/2024 3:27 PM Dictation workstation:   [id number]       Assessment/Plan   Diagnoses and all orders for this visit:  Stenosis of right carotid artery  -     Vascular US Carotid Artery Duplex Bilateral; Future  -     clopidogrel (Plavix) 75 mg tablet; Take 1 tablet (75 mg) by mouth once daily.  Postop carotid endarterectomy surveillance, encounter for  -     Vascular US Carotid Artery Duplex Bilateral; Future      75yo female presenting for carotid follow-up.  She has history of right CEA 10 years ago.  She denies lateralizing symptoms and no focal deficits noted on exam. Recent carotid duplex revealed a high-grade stenosis seen in the right common carotid artery.  CTA of the head and neck does also confirm focal stenosis of the right common carotid artery that is chronic and has been stable when compared to prior studies as far back as 2022.  Endarterectomy site appears patent without evidence of any significant restenosis.  Therefore I would not recommend any surgical intervention at this time.  She should continue medical management with aspirin, clopidogrel, and atorvastatin.  She is to follow-up in 6 months with repeat carotid duplex.    (This note was generated with voice recognition software and may contain  errors including spelling, grammar, syntax and missed recognition of what was dictated, of which may not have been fully corrected)        Nupur Easton MD

## 2024-10-21 ENCOUNTER — TELEPHONE (OUTPATIENT)
Dept: PRIMARY CARE | Facility: CLINIC | Age: 74
End: 2024-10-21
Payer: MEDICARE

## 2024-10-21 NOTE — TELEPHONE ENCOUNTER
Refill request     Med name-lamisil  Med dose-250mg  Med directions-take 1 tablet daily    Pharmacy-[facility]  Pharmacy address-[facility]    LR-06/20/24  LV-09/24/24  Nv-12/16/24    Thank-you

## 2024-12-10 ENCOUNTER — LAB (OUTPATIENT)
Dept: LAB | Facility: LAB | Age: 74
End: 2024-12-10
Payer: MEDICARE

## 2024-12-10 DIAGNOSIS — I10 HYPERTENSION, UNSPECIFIED TYPE: ICD-10-CM

## 2024-12-10 LAB
ALBUMIN SERPL BCP-MCNC: 4.1 G/DL (ref 3.4–5)
ALP SERPL-CCNC: 72 U/L (ref 33–136)
ALT SERPL W P-5'-P-CCNC: 13 U/L (ref 7–45)
ANION GAP SERPL CALC-SCNC: 14 MMOL/L (ref 10–20)
AST SERPL W P-5'-P-CCNC: 23 U/L (ref 9–39)
BILIRUB SERPL-MCNC: 0.8 MG/DL (ref 0–1.2)
BUN SERPL-MCNC: 11 MG/DL (ref 6–23)
CALCIUM SERPL-MCNC: 10.3 MG/DL (ref 8.6–10.6)
CHLORIDE SERPL-SCNC: 99 MMOL/L (ref 98–107)
CO2 SERPL-SCNC: 29 MMOL/L (ref 21–32)
CREAT SERPL-MCNC: 0.63 MG/DL (ref 0.5–1.05)
EGFRCR SERPLBLD CKD-EPI 2021: >90 ML/MIN/1.73M*2
GLUCOSE SERPL-MCNC: 111 MG/DL (ref 74–99)
POTASSIUM SERPL-SCNC: 3.9 MMOL/L (ref 3.5–5.3)
PROT SERPL-MCNC: 6.8 G/DL (ref 6.4–8.2)
SODIUM SERPL-SCNC: 138 MMOL/L (ref 136–145)

## 2024-12-10 PROCEDURE — 80053 COMPREHEN METABOLIC PANEL: CPT

## 2024-12-10 PROCEDURE — 36415 COLL VENOUS BLD VENIPUNCTURE: CPT

## 2024-12-16 ENCOUNTER — APPOINTMENT (OUTPATIENT)
Dept: PRIMARY CARE | Facility: CLINIC | Age: 74
End: 2024-12-16
Payer: COMMERCIAL

## 2024-12-16 VITALS
HEIGHT: 62 IN | SYSTOLIC BLOOD PRESSURE: 149 MMHG | WEIGHT: 107 LBS | OXYGEN SATURATION: 96 % | BODY MASS INDEX: 19.69 KG/M2 | HEART RATE: 77 BPM | DIASTOLIC BLOOD PRESSURE: 78 MMHG

## 2024-12-16 DIAGNOSIS — I10 HYPERTENSION, UNSPECIFIED TYPE: Primary | ICD-10-CM

## 2024-12-16 DIAGNOSIS — F17.218 CIGARETTE NICOTINE DEPENDENCE WITH OTHER NICOTINE-INDUCED DISORDER: ICD-10-CM

## 2024-12-16 DIAGNOSIS — F41.1 GAD (GENERALIZED ANXIETY DISORDER): ICD-10-CM

## 2024-12-16 DIAGNOSIS — B35.1 ONYCHOMYCOSIS: ICD-10-CM

## 2024-12-16 DIAGNOSIS — M81.0 OSTEOPOROSIS, SENILE: ICD-10-CM

## 2024-12-16 DIAGNOSIS — J43.9 PULMONARY EMPHYSEMA, UNSPECIFIED EMPHYSEMA TYPE (MULTI): ICD-10-CM

## 2024-12-16 DIAGNOSIS — E78.5 HYPERLIPIDEMIA, UNSPECIFIED HYPERLIPIDEMIA TYPE: ICD-10-CM

## 2024-12-16 PROCEDURE — 1123F ACP DISCUSS/DSCN MKR DOCD: CPT | Performed by: FAMILY MEDICINE

## 2024-12-16 PROCEDURE — 3077F SYST BP >= 140 MM HG: CPT | Performed by: FAMILY MEDICINE

## 2024-12-16 PROCEDURE — 99214 OFFICE O/P EST MOD 30 MIN: CPT | Performed by: FAMILY MEDICINE

## 2024-12-16 PROCEDURE — 1159F MED LIST DOCD IN RCRD: CPT | Performed by: FAMILY MEDICINE

## 2024-12-16 PROCEDURE — G2211 COMPLEX E/M VISIT ADD ON: HCPCS | Performed by: FAMILY MEDICINE

## 2024-12-16 PROCEDURE — 1160F RVW MEDS BY RX/DR IN RCRD: CPT | Performed by: FAMILY MEDICINE

## 2024-12-16 PROCEDURE — 3078F DIAST BP <80 MM HG: CPT | Performed by: FAMILY MEDICINE

## 2024-12-16 RX ORDER — ATORVASTATIN CALCIUM 10 MG/1
10 TABLET, FILM COATED ORAL DAILY
Qty: 90 TABLET | Refills: 2 | Status: SHIPPED | OUTPATIENT
Start: 2024-12-16 | End: 2024-12-16

## 2024-12-16 RX ORDER — VALSARTAN AND HYDROCHLOROTHIAZIDE 160; 12.5 MG/1; MG/1
1 TABLET, FILM COATED ORAL DAILY
Qty: 90 TABLET | Refills: 2 | Status: SHIPPED | OUTPATIENT
Start: 2024-12-16

## 2024-12-16 RX ORDER — ALENDRONATE SODIUM 70 MG/1
70 TABLET ORAL
Qty: 12 TABLET | Refills: 2 | Status: SHIPPED | OUTPATIENT
Start: 2024-12-16 | End: 2025-09-12

## 2024-12-16 RX ORDER — ALPRAZOLAM 0.5 MG/1
0.5 TABLET ORAL 2 TIMES DAILY PRN
Qty: 40 TABLET | Refills: 2 | Status: SHIPPED | OUTPATIENT
Start: 2024-12-16

## 2024-12-16 RX ORDER — ATORVASTATIN CALCIUM 40 MG/1
40 TABLET, FILM COATED ORAL DAILY
Qty: 90 TABLET | Refills: 2 | Status: SHIPPED | OUTPATIENT
Start: 2024-12-16 | End: 2026-01-20

## 2024-12-16 NOTE — PROGRESS NOTES
General Medical Management Note    74 y.o. female presents for Medical Management  HPI    Elevated BP - thinks it's seasonal.  More stress.    Right hand and Left arm precancerous skin lesions removed    KB - last dose Xanax last night.  Last refilled 2024.  40 tablets/month.   in 2011.  He daughter  in  and another daughter  in .  Significant stress related to these events at this time of year.  She does have good family support.  Another daughter lives with her.    Osteoporosis - compliant with Fosamax    Carotid artery atherosclerosis.  Managed by Dr. Nupur Easton, Vasc Surg.    Started Plavix  - Hyperlipidemia: Compliant with Lipitor 10 mg daily.  LDL cholesterol remains above 70  Still smoking.  Not ready to quit.    Onychomycosis of toenails.  Right great toe.  No signs of improvement.        Past Medical History:   Diagnosis Date    Actinic keratoses 2023    Formatting of this note might be different from the original. UPPER CHEST LEFT OF MIDLINE, RIGHT MEDIAL CHEEK    Arthralgia of hip 2024    Basal cell carcinoma of skin, unspecified     Skin cancer, basal cell    COPD (chronic obstructive pulmonary disease) (Multi) 02/10/2023    KB (generalized anxiety disorder) 02/10/2023    Hyperlipidemia 02/10/2023    Hypertension 02/10/2023    Pneumonia, unspecified organism 10/15/2018    Pneumonia, primary atypical    Protein-calorie malnutrition, unspecified severity (Multi) 2024    Somatic dysfunction of lumbosacral region 02/10/2023    Squamous cell carcinoma, arm 2023    Formatting of this note might be different from the original. LEFT UPPER ARM    Unintended weight loss 2024      Past Surgical History:   Procedure Laterality Date    APPENDECTOMY  09/15/2014    Appendectomy    CATARACT EXTRACTION EXTRACAPSULAR W/ INTRAOCULAR LENS IMPLANTATION Bilateral 2023     SECTION, CLASSIC  09/15/2014     Section     COLONOSCOPY  04/14/2016    Complete Colonoscopy    CT ANGIO NECK  03/07/2022    CT NECK ANGIO W AND WO IV CONTRAST 3/7/2022 [facility]    CT HEAD ANGIO W AND WO IV CONTRAST  03/07/2022    CT HEAD ANGIO W AND WO IV CONTRAST 3/7/2022 [facility]    OTHER SURGICAL HISTORY  09/15/2014    Endarterectomy Common Carotid    OTHER SURGICAL HISTORY  11/08/2018    Carotid thromboendarterectomy    OTHER SURGICAL HISTORY  04/17/2017    Mohs Micrographic Surgery Nose     Family History   Problem Relation Name Age of Onset    Other (worker's pneumoconiosis) Father          's    Fibromyalgia Sister        Social History     Socioeconomic History    Marital status:      Spouse name: Not on file    Number of children: Not on file    Years of education: Not on file    Highest education level: Not on file   Occupational History    Not on file   Tobacco Use    Smoking status: Every Day     Current packs/day: 1.00     Average packs/day: 1 pack/day for 55.0 years (55.0 ttl pk-yrs)     Types: Cigarettes    Smokeless tobacco: Never   Substance and Sexual Activity    Alcohol use: Not Currently    Drug use: Yes     Types: Benzodiazepines    Sexual activity: Not on file   Other Topics Concern    Not on file   Social History Narrative    Not on file     Social Drivers of Health     Financial Resource Strain: Not on file   Food Insecurity: Not on file   Transportation Needs: Not on file   Physical Activity: Not on file   Stress: Not on file   Social Connections: Not on file   Intimate Partner Violence: Not on file   Housing Stability: Not on file       Current Outpatient Medications on File Prior to Visit   Medication Sig Dispense Refill    alendronate (Fosamax) 70 mg tablet Take 1 tablet (70 mg) by mouth every 7 days. Take in the morning with a full glass of water, on an empty stomach, and do not take anything else by mouth or lie down for the next 30 min. 12 tablet 2    ALPRAZolam (Xanax) 0.5 mg tablet Take  "1 tablet (0.5 mg) by mouth 2 times a day as needed for anxiety. 40 tablet 2    atorvastatin (Lipitor) 10 mg tablet Take 1 tablet (10 mg) by mouth once daily. 90 tablet 2    calcium carbonate-vitamin D3 500 mg-5 mcg (200 unit) tablet Take 2 tablets by mouth once daily.      clopidogrel (Plavix) 75 mg tablet Take 1 tablet (75 mg) by mouth once daily. 30 tablet 11    multivitamin-min-iron-FA-vit K (Adults Multivitamin) 18 mg iron-400 mcg-25 mcg tablet Take 1 tablet by mouth once daily.      valsartan-hydrochlorothiazide (Diovan-HCT) 160-12.5 mg tablet Take 1 tablet by mouth once daily. 90 tablet 2     No current facility-administered medications on file prior to visit.       Allergies   Allergen Reactions    Paroxetine Other    Paxil [Paroxetine Hcl] Other     Blurred vision    Zoloft [Sertraline] Blurred vision         ROS: Denies chest pain, SOB, Headache, GI problems     Visit Vitals  /78   Pulse 77   Ht 1.575 m (5' 2\")   Wt 48.5 kg (107 lb)   LMP 07/01/1991   SpO2 96%   BMI 19.57 kg/m²   OB Status Postmenopausal   Smoking Status Every Day   BSA 1.46 m²      Vitals:    12/16/24 1032 12/16/24 1035   BP: 150/82 149/78   Pulse: 77    SpO2: 96%    Weight: 48.5 kg (107 lb)    Height: 1.575 m (5' 2\")        PHYSICAL EXAM:  Alert and oriented x3.  Eyes: EOM grossly intact  Neck supple without lymph adenopathy.  Significant right carotid bruit; softer left carotid bruit.  No masses or thyromegaly  Heart regular rate and rhythm without murmur.  Lungs clear to auscultation.  Legs without edema.  Gait is non-antalgic  Speech clear.  Hearing adequate.          DIAGNOSIS/PLAN:  1. Hypertension, unspecified type (Primary)  -Blood pressure elevated today, likely due to stress.  - Comprehensive Metabolic Panel; Future  - valsartan-hydrochlorothiazide (Diovan-HCT) 160-12.5 mg tablet; Take 1 tablet by mouth once daily.  Dispense: 90 tablet; Refill: 2    2. Osteoporosis, senile  - alendronate (Fosamax) 70 mg tablet; Take 1 " tablet (70 mg) by mouth every 7 days. Take in the morning with a full glass of water, on an empty stomach, and do not take anything else by mouth or lie down for the next 30 min.  Dispense: 12 tablet; Refill: 2    3. KB (generalized anxiety disorder)  - ALPRAZolam (Xanax) 0.5 mg tablet; Take 1 tablet (0.5 mg) by mouth 2 times a day as needed for anxiety.  Dispense: 40 tablet; Refill: 2    4. Hyperlipidemia, unspecified hyperlipidemia type  -Increase Lipitor to achieve LDL goal less than 70, ideally less than 50 to promote reversal of atherosclerosis  - atorvastatin (Lipitor) 40 mg tablet; Take 1 tablet (40 mg) by mouth once daily.  Dispense: 90 tablet; Refill: 2    -Continue to follow-up with vascular surgeon to monitor carotid artery atherosclerosis    5. Onychomycosis  - Referral to Podiatry; Future    6.  Nicotine addiction and COPD  - Tobacco use: Patient encouraged to stop smoking.  The patient is aware of the detrimental effects of long-term smoking on overall health and life expectancy.  Call 0-800QUIT-NOW for additional stop smoking counselling free of charge.    Over the past several years, I have counseled the patient about smoking/tobacco cessation and how I can support efforts when patient is ready to quit.  I have discussed nicotine replacement therapy, Chantix, Wellbutrin, hypnosis, support groups and acupuncture as potential options.  Patient currently has no signs or symptoms of tobacco related disease.  If interested in hypnotism, ask for referral to [facility] or call [facility] in [place] at [phone]    Follow up in 3 months for Xanax refill; 6 months for annual comprehensive medical evaluation and preventive services screening    I will continue to monitor, evaluate, assess and treat all problems/diagnoses as appropriate and continue to collaborate with specialists.    Contact office or send a [employer] MY Chart message with any questions or concerns    Encouraged  to sign up with my  My Chart  Patient will only be notified of labs that require medical intervention.    Prescriptions will not be filled unless you are compliant with your follow up appointments or have a follow up appointment scheduled as per instruction of your physician. Refills should be requested at the time of your visit.    **Charting was completed using voice recognition technology and may include unintended errors**    Caio Broderick DO, YOSELIN  [id number] [facility]  [place] [id number] [phone]  Caio Broderick DO, YOSELIN

## 2025-01-20 ENCOUNTER — HOSPITAL ENCOUNTER (OUTPATIENT)
Dept: RADIOLOGY | Facility: CLINIC | Age: 75
Discharge: HOME | End: 2025-01-20
Payer: MEDICARE

## 2025-01-20 DIAGNOSIS — Z87.891 PERSONAL HISTORY OF NICOTINE DEPENDENCE: ICD-10-CM

## 2025-01-20 PROCEDURE — 71271 CT THORAX LUNG CANCER SCR C-: CPT

## 2025-01-20 PROCEDURE — 71271 CT THORAX LUNG CANCER SCR C-: CPT | Performed by: RADIOLOGY

## 2025-01-23 DIAGNOSIS — F17.218 CIGARETTE NICOTINE DEPENDENCE WITH OTHER NICOTINE-INDUCED DISORDER: Primary | ICD-10-CM

## 2025-01-23 DIAGNOSIS — R91.8 PULMONARY NODULES: ICD-10-CM

## 2025-02-17 ENCOUNTER — APPOINTMENT (OUTPATIENT)
Dept: PODIATRY | Facility: CLINIC | Age: 75
End: 2025-02-17
Payer: MEDICARE

## 2025-02-17 DIAGNOSIS — B35.1 ONYCHOMYCOSIS: ICD-10-CM

## 2025-02-17 DIAGNOSIS — M79.675 PAIN OF TOE OF LEFT FOOT: Primary | ICD-10-CM

## 2025-02-17 PROCEDURE — 1159F MED LIST DOCD IN RCRD: CPT | Performed by: PODIATRIST

## 2025-02-17 PROCEDURE — 1123F ACP DISCUSS/DSCN MKR DOCD: CPT | Performed by: PODIATRIST

## 2025-02-17 PROCEDURE — 1160F RVW MEDS BY RX/DR IN RCRD: CPT | Performed by: PODIATRIST

## 2025-02-17 PROCEDURE — 99203 OFFICE O/P NEW LOW 30 MIN: CPT | Performed by: PODIATRIST

## 2025-02-17 RX ORDER — ASPIRIN 81 MG/1
81 TABLET ORAL ONCE
COMMUNITY

## 2025-02-17 NOTE — PROGRESS NOTES
History Of Present Illness  Laila Gonzales is a 74 y.o. female presenting with chief complaint of: transition of care: patient complains of fungus of right great toenail.  Patient did try taking p.o. Lamisil.  It was unsuccessful in improving the appearance of her toenail.    PCP Caio Broderick DO  Last visit 24     Past Medical History  She has a past medical history of Actinic keratoses (2023), Arthralgia of hip (2024), Basal cell carcinoma of skin, unspecified, COPD (chronic obstructive pulmonary disease) (Multi) (02/10/2023), KB (generalized anxiety disorder) (02/10/2023), Hyperlipidemia (02/10/2023), Hypertension (02/10/2023), Pneumonia, unspecified organism (10/15/2018), Protein-calorie malnutrition, unspecified severity (Multi) (2024), Somatic dysfunction of lumbosacral region (02/10/2023), Squamous cell carcinoma, arm (2023), and Unintended weight loss (2024).    Surgical History  She has a past surgical history that includes  section, classic (09/15/2014); Appendectomy (09/15/2014); Other surgical history (09/15/2014); Other surgical history (2018); Other surgical history (2017); CT angio head w and wo IV contrast (2022); CT angio neck (2022); Colonoscopy (2016); and Cataract extraction, extracapsular w/ intraocular lens implant (Bilateral, 2023).     Social History  She reports that she has been smoking cigarettes. She has a 55 pack-year smoking history. She has never used smokeless tobacco. She reports that she does not currently use alcohol. She reports current drug use. Drug: Benzodiazepines.    Family History  Family History   Problem Relation Name Age of Onset    Other (worker's pneumoconiosis) Father          's    Fibromyalgia Sister          Allergies  Paroxetine, Paxil [paroxetine hcl], and Zoloft [sertraline]    Medications  Current Outpatient Medications   Medication Sig Dispense Refill    alendronate (Fosamax)  70 mg tablet Take 1 tablet (70 mg) by mouth every 7 days. Take in the morning with a full glass of water, on an empty stomach, and do not take anything else by mouth or lie down for the next 30 min. 12 tablet 2    ALPRAZolam (Xanax) 0.5 mg tablet Take 1 tablet (0.5 mg) by mouth 2 times a day as needed for anxiety. 40 tablet 2    aspirin (Adult Aspirin Regimen) 81 mg EC tablet Take 1 tablet (81 mg) by mouth 1 time.      atorvastatin (Lipitor) 40 mg tablet Take 1 tablet (40 mg) by mouth once daily. 90 tablet 2    calcium carbonate-vitamin D3 500 mg-5 mcg (200 unit) tablet Take 2 tablets by mouth once daily.      clopidogrel (Plavix) 75 mg tablet Take 1 tablet (75 mg) by mouth once daily. 30 tablet 11    multivitamin-min-iron-FA-vit K (Adults Multivitamin) 18 mg iron-400 mcg-25 mcg tablet Take 1 tablet by mouth once daily.      valsartan-hydrochlorothiazide (Diovan-HCT) 160-12.5 mg tablet Take 1 tablet by mouth once daily. 90 tablet 2     No current facility-administered medications for this visit.       Review of Systems    REVIEW OF SYSTEMS  GENERAL:  Negative for malaise, significant weight loss, fever  CARDIOVASCULAR: leg swelling   MUSCULOSKELETAL:  Negative for joint pain or swelling, back pain, and muscle pain.  SKIN:  Negative for lesions, rash, and itching  PSYCH:  Negative for sleep disturbance, mood disorder and recent psychosocial stressors  NEURO: Negative, denies any burning, tingling or numbness     Objective:   Vasc: DP and PT pulses are palpable bilateral.  CFT is less than 3 seconds bilateral.  Skin temperature is warm to cool proximal to distal bilateral.      Neuro:  Light touch is intact to the foot bilateral.    There is no clonus noted.  The hallux is downgoing bilateral.      Derm: Nails are normal except for right first toenail which is anel hornlike.  It is thickened loosened with subungual debris.  It is discolored.. Skin is supple with normal texture and turgor noted.  Webspaces are clean,  dry and intact bilateral.  There are no hyperkeratoses, ulcerations, verruca or other lesions noted.      Ortho: Muscle strength is 5/5 for all pedal groups tested.  Ankle joint, subtalar joint, 1st MPJ and lesser MPJ ROM is full and without pain or crepitus.  The foot type is rectus bilateral off weight bearing.  There are no structural deformities noted.    Assessment/Plan     Diagnoses and all orders for this visit:  Pain of toe of left foot  Onychomycosis      Explained to patient that she may have toenail bed damage as well as onychomycosis.  If patient has nailbed damage, her toenail will never appear normal.  At this time aggressive debridement is done of the toenail for pain relief.  At home, patient can use Kerasal topically to improve appearance of toenail and make it easier for her to cut her toenail.

## 2025-03-25 ENCOUNTER — APPOINTMENT (OUTPATIENT)
Dept: PRIMARY CARE | Facility: CLINIC | Age: 75
End: 2025-03-25
Payer: COMMERCIAL

## 2025-03-25 VITALS
HEIGHT: 62 IN | DIASTOLIC BLOOD PRESSURE: 67 MMHG | SYSTOLIC BLOOD PRESSURE: 130 MMHG | WEIGHT: 113 LBS | BODY MASS INDEX: 20.8 KG/M2

## 2025-03-25 DIAGNOSIS — F41.1 GAD (GENERALIZED ANXIETY DISORDER): ICD-10-CM

## 2025-03-25 DIAGNOSIS — R09.89 BRUIT OF RIGHT CAROTID ARTERY: ICD-10-CM

## 2025-03-25 DIAGNOSIS — R53.83 OTHER FATIGUE: Primary | ICD-10-CM

## 2025-03-25 PROCEDURE — 3075F SYST BP GE 130 - 139MM HG: CPT | Performed by: FAMILY MEDICINE

## 2025-03-25 PROCEDURE — 3078F DIAST BP <80 MM HG: CPT | Performed by: FAMILY MEDICINE

## 2025-03-25 PROCEDURE — 1123F ACP DISCUSS/DSCN MKR DOCD: CPT | Performed by: FAMILY MEDICINE

## 2025-03-25 PROCEDURE — 1159F MED LIST DOCD IN RCRD: CPT | Performed by: FAMILY MEDICINE

## 2025-03-25 PROCEDURE — 99213 OFFICE O/P EST LOW 20 MIN: CPT | Performed by: FAMILY MEDICINE

## 2025-03-25 RX ORDER — ALPRAZOLAM 0.5 MG/1
0.5 TABLET ORAL 2 TIMES DAILY PRN
Qty: 40 TABLET | Refills: 2 | Status: SHIPPED | OUTPATIENT
Start: 2025-03-25

## 2025-03-25 NOTE — PROGRESS NOTES
HPI 74 y.o. female presents for evaluation and refill of controlled substance.      Xanax used up to twice daily.  Uses 40 tablets/month.  Last refill 2025.  Last dose was last night.    Complains of significant fatigue since increasing Lipitor to 40 mg daily.    Vascular surgeon recently started Plavix due to carotid artery disease.    Past Medical History:   Diagnosis Date    Actinic keratoses 2023    Formatting of this note might be different from the original. UPPER CHEST LEFT OF MIDLINE, RIGHT MEDIAL CHEEK    Arthralgia of hip 2024    Basal cell carcinoma of skin, unspecified     Skin cancer, basal cell    COPD (chronic obstructive pulmonary disease) (Multi) 02/10/2023    KB (generalized anxiety disorder) 02/10/2023    Hyperlipidemia 02/10/2023    Hypertension 02/10/2023    Pneumonia, unspecified organism 10/15/2018    Pneumonia, primary atypical    Protein-calorie malnutrition, unspecified severity (Multi) 2024    Somatic dysfunction of lumbosacral region 02/10/2023    Squamous cell carcinoma, arm 2023    Formatting of this note might be different from the original. LEFT UPPER ARM    Unintended weight loss 2024      Past Surgical History:   Procedure Laterality Date    APPENDECTOMY  09/15/2014    Appendectomy    CATARACT EXTRACTION EXTRACAPSULAR W/ INTRAOCULAR LENS IMPLANTATION Bilateral 2023     SECTION, CLASSIC  09/15/2014     Section    COLONOSCOPY  2016    Complete Colonoscopy    CT ANGIO NECK  2022    CT NECK ANGIO W AND WO IV CONTRAST 3/7/2022 [facility]    CT HEAD ANGIO W AND WO IV CONTRAST  2022    CT HEAD ANGIO W AND WO IV CONTRAST 3/7/2022 [facility]    OTHER SURGICAL HISTORY  09/15/2014    Endarterectomy Common Carotid    OTHER SURGICAL HISTORY  2018    Carotid thromboendarterectomy    OTHER SURGICAL HISTORY  2017    Mohs Micrographic Surgery Nose     Family History   Problem Relation  Name Age of Onset    Other (worker's pneumoconiosis) Father          's    Fibromyalgia Sister        Social History     Socioeconomic History    Marital status:      Spouse name: Not on file    Number of children: Not on file    Years of education: Not on file    Highest education level: Not on file   Occupational History    Not on file   Tobacco Use    Smoking status: Every Day     Current packs/day: 1.00     Average packs/day: 1 pack/day for 55.0 years (55.0 ttl pk-yrs)     Types: Cigarettes    Smokeless tobacco: Never   Substance and Sexual Activity    Alcohol use: Not Currently    Drug use: Yes     Types: Benzodiazepines    Sexual activity: Not on file   Other Topics Concern    Not on file   Social History Narrative    Not on file     Social Drivers of Health     Financial Resource Strain: Not on file   Food Insecurity: Not on file   Transportation Needs: Not on file   Physical Activity: Not on file   Stress: Not on file   Social Connections: Not on file   Intimate Partner Violence: Not on file   Housing Stability: Not on file       Current Outpatient Medications on File Prior to Visit   Medication Sig Dispense Refill    alendronate (Fosamax) 70 mg tablet Take 1 tablet (70 mg) by mouth every 7 days. Take in the morning with a full glass of water, on an empty stomach, and do not take anything else by mouth or lie down for the next 30 min. 12 tablet 2    ALPRAZolam (Xanax) 0.5 mg tablet Take 1 tablet (0.5 mg) by mouth 2 times a day as needed for anxiety. 40 tablet 2    aspirin (Adult Aspirin Regimen) 81 mg EC tablet Take 1 tablet (81 mg) by mouth 1 time.      atorvastatin (Lipitor) 40 mg tablet Take 1 tablet (40 mg) by mouth once daily. 90 tablet 2    calcium carbonate-vitamin D3 500 mg-5 mcg (200 unit) tablet Take 2 tablets by mouth once daily.      clopidogrel (Plavix) 75 mg tablet Take 1 tablet (75 mg) by mouth once daily. 30 tablet 11    multivitamin-min-iron-FA-vit K (Adults Multivitamin) 18  "mg iron-400 mcg-25 mcg tablet Take 1 tablet by mouth once daily.      valsartan-hydrochlorothiazide (Diovan-HCT) 160-12.5 mg tablet Take 1 tablet by mouth once daily. 90 tablet 2     No current facility-administered medications on file prior to visit.       Allergies   Allergen Reactions    Paroxetine Other    Paxil [Paroxetine Hcl] Other     Blurred vision    Zoloft [Sertraline] Blurred vision       Visit Vitals  /67   Ht 1.575 m (5' 2\")   Wt 51.3 kg (113 lb)   LMP 07/01/1991   BMI 20.67 kg/m²   OB Status Postmenopausal   Smoking Status Every Day   BSA 1.5 m²        EXAM:  Alert and oriented ×3.  No acute distress.  No tremors noted.  Gait is normal.  Mood and affect are normal.     Assessment/Diagnosis  1. KB (generalized anxiety disorder)  - ALPRAZolam (Xanax) 0.5 mg tablet; Take 1 tablet (0.5 mg) by mouth 2 times a day as needed for anxiety.  Dispense: 40 tablet; Refill: 2    2. Other fatigue (Primary)  Possibly due to increased Lipitor dose.  Patient will stop Lipitor for 1 to 2 weeks to determine effects on fatigue.  - If fatigue improves, will resume Lipitor at 20 mg daily    3. Bruit of right carotid artery  Low up with vascular surgery        Plan    [place] reviewed.  Controlled Substance Agreement is current.  Urine drug screen up to date.    CONTROLLED SUBSTANCE USE:   Patient is aware of Dr. Broderick's and Ashlyn Hazel's practice rules for use of scheduled medication.  Has a signed contract stating that patient will only receive controlled substance prescriptions from Dr. Broderick, will only receive a one month supply, will fill prescriptions at one pharmacy, and agrees to a random urine drug screen.  Patient is aware that she must have an office appointment every 90 days to continue to receive benzodiazepines or narcotics.        Follow up in 3 months for medical management    I will continue to monitor, evaluate, assess and treat all problems/diagnoses as appropriate and continue to collaborate with " specialists.    Contact office or send a  [facility] message with any questions or concerns    Patient will only be notified of labs that require medical intervention.    Prescriptions will not be filled unless you are compliant with your follow up appointments or have a follow up appointment scheduled as per instruction of your physician. Refills should be requested at the time of your visit.    **Charting was completed using voice recognition technology and may include unintended errors**    Caio Broderick DO, FACOFP  Senior Attending Physician  [facility]  [id number], [id number]  [place] [address] [phone]

## 2025-04-01 ENCOUNTER — HOSPITAL ENCOUNTER (OUTPATIENT)
Dept: VASCULAR MEDICINE | Facility: CLINIC | Age: 75
Discharge: HOME | End: 2025-04-01
Payer: MEDICARE

## 2025-04-01 DIAGNOSIS — I65.21 STENOSIS OF RIGHT CAROTID ARTERY: ICD-10-CM

## 2025-04-01 DIAGNOSIS — Z48.812 POSTOP CAROTID ENDARTERECTOMY SURVEILLANCE, ENCOUNTER FOR: ICD-10-CM

## 2025-04-01 PROCEDURE — 93880 EXTRACRANIAL BILAT STUDY: CPT

## 2025-04-01 PROCEDURE — 93880 EXTRACRANIAL BILAT STUDY: CPT | Performed by: SURGERY

## 2025-04-07 ENCOUNTER — APPOINTMENT (OUTPATIENT)
Facility: CLINIC | Age: 75
End: 2025-04-07
Payer: MEDICARE

## 2025-04-07 VITALS
HEIGHT: 62 IN | DIASTOLIC BLOOD PRESSURE: 64 MMHG | SYSTOLIC BLOOD PRESSURE: 106 MMHG | WEIGHT: 110.4 LBS | BODY MASS INDEX: 20.32 KG/M2

## 2025-04-07 DIAGNOSIS — Z12.31 SCREENING MAMMOGRAM FOR BREAST CANCER: Primary | ICD-10-CM

## 2025-04-07 DIAGNOSIS — Z01.419 WELL WOMAN EXAM WITH ROUTINE GYNECOLOGICAL EXAM: ICD-10-CM

## 2025-04-07 PROCEDURE — 3074F SYST BP LT 130 MM HG: CPT | Performed by: OBSTETRICS & GYNECOLOGY

## 2025-04-07 PROCEDURE — G0101 CA SCREEN;PELVIC/BREAST EXAM: HCPCS | Performed by: OBSTETRICS & GYNECOLOGY

## 2025-04-07 PROCEDURE — 1159F MED LIST DOCD IN RCRD: CPT | Performed by: OBSTETRICS & GYNECOLOGY

## 2025-04-07 PROCEDURE — 1123F ACP DISCUSS/DSCN MKR DOCD: CPT | Performed by: OBSTETRICS & GYNECOLOGY

## 2025-04-07 PROCEDURE — 3078F DIAST BP <80 MM HG: CPT | Performed by: OBSTETRICS & GYNECOLOGY

## 2025-04-07 ASSESSMENT — LIFESTYLE VARIABLES
SKIP TO QUESTIONS 9-10: 1
HOW OFTEN DO YOU HAVE SIX OR MORE DRINKS ON ONE OCCASION: NEVER
AUDIT-C TOTAL SCORE: 0
HOW MANY STANDARD DRINKS CONTAINING ALCOHOL DO YOU HAVE ON A TYPICAL DAY: PATIENT DOES NOT DRINK
HOW OFTEN DO YOU HAVE A DRINK CONTAINING ALCOHOL: NEVER

## 2025-04-07 NOTE — PROGRESS NOTES
Subjective   Patient ID: Laila Gonzales is a 74 y.o. female who presents for Annual Exam.    Last pap: yrs ago  Last mamm: 4/27/23  LMP: absent  Contraception: menopause  Sexually active: no  Self breast exam: no  Diet: no  Exercise: house and yard work    HPI  Doing well. No complaints. Wants mammo screen ordered.     Review of Systems  Neg   Objective   Physical Exam  Physical Exam      Vitals:    04/07/25 1345   BP: 106/64         Appearance: Normal appearance. Affect normal and alert  Pulmonary:      Effort: Pulmonary effort is normal. Breath sounds clear  Skin: no rashes or lesions     Breasts:     Breasts bilaterally are symmetrical. No masses or axillary adenopathy. No skin or nipple changes    Abdominal:     Abdomen is flat, soft, nontender. No distension. No mass palpated.      Genitourinary:     Labia: no skin lesions or rash       Urethra: No lesions.      Bladder with no prolapse     Vagina: No discharge, mucosa is pink with no lesions.     Cervix:    mobile and nontender no discharge     Uterus:   Not enlarged, small, nontender.      Adnexa: Bilaterally with  No mass or tenderness.            Extremities:  Nontender, no edema. Normal range of motion    Assessment/Plan   Diagnoses and all orders for this visit:  Screening mammogram for breast cancer  -     BI mammo bilateral screening tomosynthesis; Future  Well woman exam with routine gynecological exam     MD Cassidy Baker CMA 04/07/25 1:39 PM

## 2025-04-08 ENCOUNTER — HOSPITAL ENCOUNTER (OUTPATIENT)
Dept: RADIOLOGY | Facility: CLINIC | Age: 75
Discharge: HOME | End: 2025-04-08
Payer: MEDICARE

## 2025-04-08 DIAGNOSIS — Z12.31 SCREENING MAMMOGRAM FOR BREAST CANCER: ICD-10-CM

## 2025-04-08 PROCEDURE — 77063 BREAST TOMOSYNTHESIS BI: CPT | Performed by: STUDENT IN AN ORGANIZED HEALTH CARE EDUCATION/TRAINING PROGRAM

## 2025-04-08 PROCEDURE — 77067 SCR MAMMO BI INCL CAD: CPT | Performed by: STUDENT IN AN ORGANIZED HEALTH CARE EDUCATION/TRAINING PROGRAM

## 2025-04-08 PROCEDURE — 77067 SCR MAMMO BI INCL CAD: CPT

## 2025-04-09 ENCOUNTER — OFFICE VISIT (OUTPATIENT)
Dept: VASCULAR SURGERY | Facility: CLINIC | Age: 75
End: 2025-04-09
Payer: MEDICARE

## 2025-04-09 VITALS
HEIGHT: 62 IN | OXYGEN SATURATION: 95 % | SYSTOLIC BLOOD PRESSURE: 130 MMHG | BODY MASS INDEX: 20.24 KG/M2 | HEART RATE: 101 BPM | DIASTOLIC BLOOD PRESSURE: 70 MMHG | WEIGHT: 110 LBS

## 2025-04-09 DIAGNOSIS — Z48.812 POSTOP CAROTID ENDARTERECTOMY SURVEILLANCE, ENCOUNTER FOR: ICD-10-CM

## 2025-04-09 DIAGNOSIS — I65.21 STENOSIS OF RIGHT CAROTID ARTERY: Primary | ICD-10-CM

## 2025-04-09 PROCEDURE — 1123F ACP DISCUSS/DSCN MKR DOCD: CPT | Performed by: SURGERY

## 2025-04-09 PROCEDURE — 99214 OFFICE O/P EST MOD 30 MIN: CPT | Performed by: SURGERY

## 2025-04-09 PROCEDURE — 3075F SYST BP GE 130 - 139MM HG: CPT | Performed by: SURGERY

## 2025-04-09 PROCEDURE — 3078F DIAST BP <80 MM HG: CPT | Performed by: SURGERY

## 2025-04-09 PROCEDURE — 1159F MED LIST DOCD IN RCRD: CPT | Performed by: SURGERY

## 2025-04-09 PROCEDURE — 1160F RVW MEDS BY RX/DR IN RCRD: CPT | Performed by: SURGERY

## 2025-04-09 ASSESSMENT — ENCOUNTER SYMPTOMS
DIZZINESS: 0
BRUISES/BLEEDS EASILY: 0
GASTROINTESTINAL NEGATIVE: 1
SPEECH DIFFICULTY: 0
CONSTITUTIONAL NEGATIVE: 1
HEADACHES: 0
COLOR CHANGE: 0
SHORTNESS OF BREATH: 0
NUMBNESS: 0
COUGH: 0
WEAKNESS: 0
EYES NEGATIVE: 1
ENDOCRINE NEGATIVE: 1
WOUND: 0
BACK PAIN: 0
PSYCHIATRIC NEGATIVE: 1

## 2025-04-09 NOTE — PROGRESS NOTES
History Of Present Illness  Laila Gonzales is a 74 y.o. female presenting for carotid follow up. She has remote h/o right CEA. Her last visit to the office was 6 months ago. She denies any new lateralizing symptoms since her last visit and states she is feeling well. She continues to take aspirin, atorvastatin, and clopidogrel daily. Recent carotid duplex reveals less than 50% ICA stenosis bilaterally. There is also note of right CCA stenosis, which is stable when compared to the prior study.     Past Medical History  She has a past medical history of Actinic keratoses (2023), Arthralgia of hip (2024), Basal cell carcinoma of skin, unspecified, Bunion, COPD (chronic obstructive pulmonary disease) (Multi) (02/10/2023), KB (generalized anxiety disorder) (02/10/2023), Hammer toe, Hyperlipidemia (02/10/2023), Hypertension (02/10/2023), Pneumonia, unspecified organism (10/15/2018), Protein-calorie malnutrition, unspecified severity (Multi) (2024), Pulmonary arterial hypertension (Multi), Somatic dysfunction of lumbosacral region (02/10/2023), Squamous cell carcinoma, arm (2023), and Unintended weight loss (2024).    Surgical History  She has a past surgical history that includes  section, classic (09/15/2014); Appendectomy (09/15/2014); Other surgical history (09/15/2014); Other surgical history (2018); Other surgical history (2017); CT angio head w and wo IV contrast (2022); CT angio neck (2022); Colonoscopy (2016); and Cataract extraction, extracapsular w/ intraocular lens implant (Bilateral, 2023).     Social History  She reports that she has been smoking cigarettes. She has a 55 pack-year smoking history. She has never used smokeless tobacco. She reports that she does not currently use alcohol. She reports current drug use. Drug: Benzodiazepines.    Family History  Family History   Problem Relation Name Age of Onset    Arthritis Mother Gwen      Other (worker's pneumoconiosis) Father Wilmer         's    Hypertension Father Wilmer     Fibromyalgia Sister Kalie     Breast cancer Sister Kalie     Cancer Sister Kalie     Depression Sister Nancy         Allergies  Paroxetine, Paxil [paroxetine hcl], and Zoloft [sertraline]    Review of Systems   Constitutional: Negative.    HENT: Negative.     Eyes: Negative.    Respiratory:  Negative for cough and shortness of breath.    Cardiovascular:  Negative for chest pain and leg swelling.   Gastrointestinal: Negative.    Endocrine: Negative.    Genitourinary: Negative.    Musculoskeletal:  Negative for back pain and gait problem.   Skin:  Negative for color change, pallor and wound.   Neurological:  Negative for dizziness, syncope, speech difficulty, weakness, numbness and headaches.   Hematological:  Does not bruise/bleed easily.   Psychiatric/Behavioral: Negative.          Physical Exam  Vitals reviewed.   Constitutional:       General: She is not in acute distress.     Appearance: Normal appearance. She is normal weight.   HENT:      Head: Normocephalic and atraumatic.   Eyes:      Extraocular Movements: Extraocular movements intact.      Conjunctiva/sclera: Conjunctivae normal.   Neck:      Vascular: No carotid bruit.   Cardiovascular:      Rate and Rhythm: Normal rate and regular rhythm.      Pulses: Normal pulses.           Radial pulses are 2+ on the right side and 2+ on the left side.      Heart sounds: Normal heart sounds.   Pulmonary:      Effort: Pulmonary effort is normal.      Breath sounds: Normal breath sounds.   Abdominal:      Palpations: Abdomen is soft.   Musculoskeletal:         General: No swelling. Normal range of motion.      Cervical back: Normal range of motion. No tenderness.   Skin:     General: Skin is warm and dry.   Neurological:      General: No focal deficit present.      Mental Status: She is alert and oriented to person, place, and time.      Cranial Nerves: Cranial nerves 2-12  "are intact. No cranial nerve deficit.      Sensory: No sensory deficit.      Motor: Motor function is intact. No weakness.   Psychiatric:         Mood and Affect: Mood normal.         Behavior: Behavior normal.          Last Recorded Vitals  Blood pressure 130/70, pulse 101, height 1.575 m (5' 2\"), weight 49.9 kg (110 lb), last menstrual period 07/01/1991, SpO2 95%.    Relevant Results      Current Outpatient Medications:     alendronate (Fosamax) 70 mg tablet, Take 1 tablet (70 mg) by mouth every 7 days. Take in the morning with a full glass of water, on an empty stomach, and do not take anything else by mouth or lie down for the next 30 min., Disp: 12 tablet, Rfl: 2    ALPRAZolam (Xanax) 0.5 mg tablet, Take 1 tablet (0.5 mg) by mouth 2 times a day as needed for anxiety., Disp: 40 tablet, Rfl: 2    aspirin (Adult Aspirin Regimen) 81 mg EC tablet, Take 1 tablet (81 mg) by mouth 1 time., Disp: , Rfl:     atorvastatin (Lipitor) 40 mg tablet, Take 1 tablet (40 mg) by mouth once daily., Disp: 90 tablet, Rfl: 2    calcium carbonate-vitamin D3 500 mg-5 mcg (200 unit) tablet, Take 2 tablets by mouth once daily., Disp: , Rfl:     clopidogrel (Plavix) 75 mg tablet, Take 1 tablet (75 mg) by mouth once daily., Disp: 30 tablet, Rfl: 11    multivitamin-min-iron-FA-vit K (Adults Multivitamin) 18 mg iron-400 mcg-25 mcg tablet, Take 1 tablet by mouth once daily., Disp: , Rfl:     valsartan-hydrochlorothiazide (Diovan-HCT) 160-12.5 mg tablet, Take 1 tablet by mouth once daily., Disp: 90 tablet, Rfl: 2       Vascular US Carotid Artery Duplex Bilateral    Result Date: 4/3/2025          [place] [address] Tel [phone] and Fax [phone]  Vascular Lab Report VASC US CAROTID ARTERY DUPLEX BILATERAL  Patient Name:      SANDEEP FRANCIS          [place] Physician:  [id number] Nupur Easton MD Study Date:        4/1/2025             " Ordering Physician: [id number] KISHA ABEBE MRN/PID:           [id number]             Technologist:       Zenon Espinal [facility] Accession#:        [id number]         Technologist 2: Date of Birth/Age: 1950 / 74 years Encounter#:         [id number] Gender:            F Admission Status:  Outpatient           Location Performed: [facility]  Diagnosis/ICD: Occlusion and stenosis of right carotid artery-I65.21 Indication:    Carotid stenosis CPT Codes:     82933 Cerebrovascular Carotid Duplex scan complete  Patient History CAD, HTN and Hyperlipidemia. Right endarterectomy                 1- R CCA and R ECA elevated velocity noted. Both ICA <50%                 stenosis. Smoker:         Current.  CONCLUSIONS: Right Carotid: Findings are consistent with less than 50% stenosis of the right proximal internal carotid artery. Proximal right ICA velocity may be underestimated due to shadowing plaque. There are elevated velocities in the right ECA that are suggestive of disease. There are elevated velocities in the right CCA that are suggestive of disease. The right vertebral artery is patent with antegrade flow. No evidence of hemodynamically significant stenosis in the right subclavian artery. Left Carotid: Findings are consistent with less than 50% stenosis of the left proximal internal carotid artery. Left external carotid artery appears patent with no evidence of stenosis. The left vertebral artery is patent with antegrade flow. No evidence of hemodynamically significant stenosis in the left subclavian artery.  Comparison: Compared with study from 1/18/2022, no significant change.  Imaging & Doppler Findings: Right Plaque Morph: The proximal right internal carotid artery demonstrates calcified and irregular plaque. The proximal right external carotid artery demonstrates calcified plaque. The proximal right common carotid artery demonstrates  calcified plaque. The mid right common carotid artery demonstrates calcified and irregular plaque. The distal right common carotid artery demonstrates calcified plaque. Left Plaque Morph: The proximal left internal carotid artery demonstrates irregular and calcified plaque. The proximal left external carotid artery demonstrates calcified plaque. The proximal left common carotid artery demonstrates irregular and calcified plaque. The distal left common carotid artery demonstrates calcified plaque.   Right                        Left   PSV      EDV                PSV      EDV 72 cm/s            CCA P    79 cm/s 308 cm/s           CCA M 308 cm/s           CCA D    81 cm/s 82 cm/s  24 cm/s   ICA P    90 cm/s  22 cm/s 84 cm/s  32 cm/s   ICA M    87 cm/s  25 cm/s 69 cm/s  31 cm/s   ICA D    73 cm/s  27 cm/s 281 cm/s            ECA     113 cm/s 26 cm/s  10 cm/s Vertebral  52 cm/s  13 cm/s 203 cm/s         Subclavian 110 cm/s               Right Left ICA/CCA Ratio  0.3  1.1   [id number] Nupur Easton MD Electronically signed by [id number] Nupur Easton MD on 4/3/2025 at 6:34:28 PM  ** Final **            Assessment/Plan   Diagnoses and all orders for this visit:  Stenosis of right carotid artery  -     Vascular US Carotid Artery Duplex Bilateral; Future  Postop carotid endarterectomy surveillance, encounter for  -     Vascular US Carotid Artery Duplex Bilateral; Future      73yo female with h/o right CEA. She has done clinically well since her surgery. She denies any lateralizing symptoms and no focal deficits are noted on exam. Carotid duplex findings remain stable with less than 50% ICA stenosis bilaterally and known stenosis of the distal right CCA. No further surgical intervention is indicated at this time. She is to continue medical management with aspirin, clopidogrel, and atorvastatin. She is to follow up in one year with repeat carotid duplex.         Nupur Easton MD

## 2025-04-23 ENCOUNTER — HOSPITAL ENCOUNTER (OUTPATIENT)
Dept: RADIOLOGY | Facility: CLINIC | Age: 75
Discharge: HOME | End: 2025-04-23
Payer: MEDICARE

## 2025-04-23 DIAGNOSIS — R91.8 PULMONARY NODULES: ICD-10-CM

## 2025-04-23 DIAGNOSIS — F17.218 CIGARETTE NICOTINE DEPENDENCE WITH OTHER NICOTINE-INDUCED DISORDER: ICD-10-CM

## 2025-04-23 PROCEDURE — 71271 CT THORAX LUNG CANCER SCR C-: CPT

## 2025-05-16 ENCOUNTER — TELEPHONE (OUTPATIENT)
Dept: PRIMARY CARE | Facility: CLINIC | Age: 75
End: 2025-05-16
Payer: MEDICARE

## 2025-05-16 NOTE — TELEPHONE ENCOUNTER
Patient called in asking if you could put in lab orders in for her so she can get it done before her apt.

## 2025-05-24 DIAGNOSIS — E78.5 HYPERLIPIDEMIA, UNSPECIFIED HYPERLIPIDEMIA TYPE: ICD-10-CM

## 2025-05-25 RX ORDER — ATORVASTATIN CALCIUM 10 MG/1
10 TABLET, FILM COATED ORAL DAILY
Qty: 90 TABLET | Refills: 2 | OUTPATIENT
Start: 2025-05-25

## 2025-06-17 LAB
25(OH)D3+25(OH)D2 SERPL-MCNC: 82 NG/ML (ref 30–100)
ALBUMIN SERPL-MCNC: 4.1 G/DL (ref 3.6–5.1)
ALP SERPL-CCNC: 65 U/L (ref 37–153)
ALT SERPL-CCNC: 17 U/L (ref 6–29)
ANION GAP SERPL CALCULATED.4IONS-SCNC: 8 MMOL/L (CALC) (ref 7–17)
AST SERPL-CCNC: 30 U/L (ref 10–35)
BILIRUB SERPL-MCNC: 0.9 MG/DL (ref 0.2–1.2)
BUN SERPL-MCNC: 16 MG/DL (ref 7–25)
CALCIUM SERPL-MCNC: 9.1 MG/DL (ref 8.6–10.4)
CHLORIDE SERPL-SCNC: 99 MMOL/L (ref 98–110)
CO2 SERPL-SCNC: 26 MMOL/L (ref 20–32)
COLOR UR: NORMAL
CREAT SERPL-MCNC: 0.56 MG/DL (ref 0.6–1)
EGFRCR SERPLBLD CKD-EPI 2021: 94 ML/MIN/1.73M2
ERYTHROCYTE [DISTWIDTH] IN BLOOD BY AUTOMATED COUNT: 11.7 % (ref 11–15)
GLUCOSE SERPL-MCNC: 89 MG/DL (ref 65–99)
HCT VFR BLD AUTO: 43.3 % (ref 35–45)
HGB BLD-MCNC: 14.3 G/DL (ref 11.7–15.5)
MCH RBC QN AUTO: 36.1 PG (ref 27–33)
MCHC RBC AUTO-ENTMCNC: 33 G/DL (ref 32–36)
MCV RBC AUTO: 109.3 FL (ref 80–100)
PLATELET # BLD AUTO: 160 THOUSAND/UL (ref 140–400)
PMV BLD REES-ECKER: 10.1 FL (ref 7.5–12.5)
POTASSIUM SERPL-SCNC: 3.7 MMOL/L (ref 3.5–5.3)
PROT SERPL-MCNC: 6.5 G/DL (ref 6.1–8.1)
RBC # BLD AUTO: 3.96 MILLION/UL (ref 3.8–5.1)
SODIUM SERPL-SCNC: 133 MMOL/L (ref 135–146)
WBC # BLD AUTO: 6.4 THOUSAND/UL (ref 3.8–10.8)

## 2025-06-24 ENCOUNTER — APPOINTMENT (OUTPATIENT)
Dept: PRIMARY CARE | Facility: CLINIC | Age: 75
End: 2025-06-24
Payer: COMMERCIAL

## 2025-06-24 VITALS
HEIGHT: 62 IN | SYSTOLIC BLOOD PRESSURE: 135 MMHG | BODY MASS INDEX: 19.8 KG/M2 | HEART RATE: 86 BPM | WEIGHT: 107.6 LBS | DIASTOLIC BLOOD PRESSURE: 77 MMHG | OXYGEN SATURATION: 95 %

## 2025-06-24 DIAGNOSIS — M81.0 AGE-RELATED OSTEOPOROSIS WITHOUT CURRENT PATHOLOGICAL FRACTURE: ICD-10-CM

## 2025-06-24 DIAGNOSIS — M81.0 OSTEOPOROSIS, SENILE: ICD-10-CM

## 2025-06-24 DIAGNOSIS — R30.0 DYSURIA: ICD-10-CM

## 2025-06-24 DIAGNOSIS — F41.1 GAD (GENERALIZED ANXIETY DISORDER): ICD-10-CM

## 2025-06-24 DIAGNOSIS — R09.89 BRUIT OF RIGHT CAROTID ARTERY: ICD-10-CM

## 2025-06-24 DIAGNOSIS — I10 HYPERTENSION, UNSPECIFIED TYPE: ICD-10-CM

## 2025-06-24 DIAGNOSIS — I70.0 ATHEROSCLEROSIS OF AORTA: ICD-10-CM

## 2025-06-24 DIAGNOSIS — E78.5 HYPERLIPIDEMIA, UNSPECIFIED HYPERLIPIDEMIA TYPE: ICD-10-CM

## 2025-06-24 DIAGNOSIS — I65.21 STENOSIS OF RIGHT CAROTID ARTERY: ICD-10-CM

## 2025-06-24 DIAGNOSIS — Z02.83 ENCOUNTER FOR DRUG SCREENING: ICD-10-CM

## 2025-06-24 DIAGNOSIS — Z00.00 MEDICARE ANNUAL WELLNESS VISIT, SUBSEQUENT: Primary | ICD-10-CM

## 2025-06-24 DIAGNOSIS — R93.89 ABNORMAL CT OF THE CHEST: ICD-10-CM

## 2025-06-24 DIAGNOSIS — J43.9 PULMONARY EMPHYSEMA, UNSPECIFIED EMPHYSEMA TYPE (MULTI): ICD-10-CM

## 2025-06-24 DIAGNOSIS — E55.9 VITAMIN D DEFICIENCY: ICD-10-CM

## 2025-06-24 DIAGNOSIS — Z51.81 ENCOUNTER FOR THERAPEUTIC DRUG LEVEL MONITORING: ICD-10-CM

## 2025-06-24 DIAGNOSIS — F17.218 CIGARETTE NICOTINE DEPENDENCE WITH OTHER NICOTINE-INDUCED DISORDER: ICD-10-CM

## 2025-06-24 DIAGNOSIS — Z00.00 ENCOUNTER FOR HEALTH MAINTENANCE EXAMINATION: ICD-10-CM

## 2025-06-24 DIAGNOSIS — M19.90 OSTEOARTHRITIS, UNSPECIFIED OSTEOARTHRITIS TYPE, UNSPECIFIED SITE: ICD-10-CM

## 2025-06-24 PROCEDURE — G0439 PPPS, SUBSEQ VISIT: HCPCS | Performed by: FAMILY MEDICINE

## 2025-06-24 PROCEDURE — 93000 ELECTROCARDIOGRAM COMPLETE: CPT | Performed by: FAMILY MEDICINE

## 2025-06-24 PROCEDURE — 3075F SYST BP GE 130 - 139MM HG: CPT | Performed by: FAMILY MEDICINE

## 2025-06-24 PROCEDURE — 99215 OFFICE O/P EST HI 40 MIN: CPT | Performed by: FAMILY MEDICINE

## 2025-06-24 PROCEDURE — 1159F MED LIST DOCD IN RCRD: CPT | Performed by: FAMILY MEDICINE

## 2025-06-24 PROCEDURE — 3078F DIAST BP <80 MM HG: CPT | Performed by: FAMILY MEDICINE

## 2025-06-24 PROCEDURE — 99497 ADVNCD CARE PLAN 30 MIN: CPT | Performed by: FAMILY MEDICINE

## 2025-06-24 PROCEDURE — 1160F RVW MEDS BY RX/DR IN RCRD: CPT | Performed by: FAMILY MEDICINE

## 2025-06-24 PROCEDURE — 1170F FXNL STATUS ASSESSED: CPT | Performed by: FAMILY MEDICINE

## 2025-06-24 PROCEDURE — G0446 INTENS BEHAVE THER CARDIO DX: HCPCS | Performed by: FAMILY MEDICINE

## 2025-06-24 RX ORDER — ALENDRONATE SODIUM 70 MG/1
70 TABLET ORAL
Qty: 12 TABLET | Refills: 2 | Status: SHIPPED | OUTPATIENT
Start: 2025-06-24 | End: 2026-03-21

## 2025-06-24 RX ORDER — CLOPIDOGREL BISULFATE 75 MG/1
75 TABLET ORAL DAILY
Qty: 30 TABLET | Refills: 11 | Status: SHIPPED | OUTPATIENT
Start: 2025-06-24 | End: 2026-06-24

## 2025-06-24 RX ORDER — ATORVASTATIN CALCIUM 40 MG/1
40 TABLET, FILM COATED ORAL DAILY
Qty: 90 TABLET | Refills: 2 | Status: SHIPPED | OUTPATIENT
Start: 2025-06-24 | End: 2026-07-29

## 2025-06-24 RX ORDER — ALPRAZOLAM 0.5 MG/1
0.5 TABLET ORAL 2 TIMES DAILY PRN
Qty: 40 TABLET | Refills: 2 | Status: SHIPPED | OUTPATIENT
Start: 2025-06-24

## 2025-06-24 ASSESSMENT — ACTIVITIES OF DAILY LIVING (ADL)
TAKING_MEDICATION: INDEPENDENT
MANAGING_FINANCES: INDEPENDENT
DRESSING: INDEPENDENT
GROCERY_SHOPPING: INDEPENDENT
DOING_HOUSEWORK: INDEPENDENT
BATHING: INDEPENDENT

## 2025-06-24 ASSESSMENT — PATIENT HEALTH QUESTIONNAIRE - PHQ9
1. LITTLE INTEREST OR PLEASURE IN DOING THINGS: NOT AT ALL
2. FEELING DOWN, DEPRESSED OR HOPELESS: NOT AT ALL
SUM OF ALL RESPONSES TO PHQ9 QUESTIONS 1 AND 2: 0

## 2025-06-24 NOTE — PROGRESS NOTES
Annual Comprehensive Medical Exam and annual Medicare wellness visit    75 y.o. female presents for annual comprehensive medical evaluation and preventive services screening.  No recent hospitalizations, surgeries or significant injuries.    History of Present Illness    Osteoporosis is managed with calcium, vitamin D and Fosamax.    Generalized anxiety disorder: Patient using Xanax once daily and occasionally a second dose as needed.  40 tablets/month.  Last refill May 19, 2025 from a prescription written on December 16, 2024.    Compliant with medication for hypertension, hyperlipidemia.  Using Plavix for stroke prevention.  Not checking BP at home.  Denies headaches, tinnitus, facial flushing, palpitations of the heart    Weight loss - 6 lb since 3/2025.  Patient states that she feels her appetite and the amount of food she is eating has not changed.  She is very busy all day long.  She continues to smoke cigarettes.    Low-dose CT chest for lung cancer screening were done twice already this year at recommendation of radiologist.  Patient would like to wait until January 2026 to repeat due to insurance coverage.        Medical History[1]   Surgical History[2]  Family History[3]   Social History     Socioeconomic History    Marital status:      Spouse name: Not on file    Number of children: Not on file    Years of education: Not on file    Highest education level: Not on file   Occupational History    Not on file   Tobacco Use    Smoking status: Every Day     Current packs/day: 1.00     Average packs/day: 1 pack/day for 55.0 years (55.0 ttl pk-yrs)     Types: Cigarettes    Smokeless tobacco: Never   Substance and Sexual Activity    Alcohol use: Not Currently    Drug use: Yes     Types: Benzodiazepines    Sexual activity: Not Currently     Partners: Male     Birth control/protection: None   Other Topics Concern    Not on file   Social History Narrative    Not on file     Social Drivers of Health  "    Financial Resource Strain: Not on file   Food Insecurity: Not on file   Transportation Needs: Not on file   Physical Activity: Not on file   Stress: Not on file   Social Connections: Not on file   Intimate Partner Violence: Not on file   Housing Stability: Not on file       Medications Ordered Prior to Encounter[4]    Allergies[5]    Complete review of systems is negative today except for that mentioned in the history of present illness.  In particular patient denies chest pain, shortness of breath, headaches and GI disturbances.      Visit Vitals  /77   Pulse 86   Ht 1.562 m (5' 1.5\")   Wt 48.8 kg (107 lb 9.6 oz)   LMP 07/01/1991   SpO2 95%   BMI 20.00 kg/m²   OB Status Postmenopausal   Smoking Status Every Day   BSA 1.46 m²      Vitals:    06/24/25 1322 06/24/25 1356 06/24/25 1357   BP: 150/75 141/80 135/77   BP Location: Right arm     Patient Position: Sitting     Pulse: 86     SpO2: 95%     Weight: 48.8 kg (107 lb 9.6 oz)     Height: 1.562 m (5' 1.5\")       Physical Exam  Gen.: Alert and oriented ×3 female in no acute distress.  HEENT: Head is normocephalic.  Pupils equal and reactive to light.  Tympanic membranes are clear.  Pharynx is clear.  Neck is supple without adenopathy or carotid bruits.  No masses or thyromegaly  Heart: Regular rate and rhythm without murmurs.  Lungs: Clear to auscultation bilaterally.  Abdomen: Soft with normal bowel sounds.  No masses or pain to palpation.  No bruits auscultated.  Extremities: Good range of motion of all joints.  No significant edema. Pedal pulses +1-2/4  Skin: No significant or irregular nevi visualized.  Neuro: No signs of focal neurologic deficit.  No tremor.  Speech and hearing are normal.  DTRs +3/4;  Muscle Strength +5/5.  Musculoskeletal: Spine with good ROM.  No scoliosis.  Leg lengths are equal.  Psych: normal affect.  No suicidal ideation.  Good judgement and insight.     The 10-year ASCVD risk score (Juani FORREST, et al., 2019) is: 35.7%    Values " used to calculate the score:      Age: 75 years      Sex: Female      Is Non- : No      Diabetic: No      Tobacco smoker: Yes      Systolic Blood Pressure: 135 mmHg      Is BP treated: Yes      HDL Cholesterol: 34.9 mg/dL      Total Cholesterol: 142 mg/dL      I discussed face-to-face with this individual discussing their cardiovascular risk and behavioral therapies of nutritional choices, exercise and elimination of habits contributing to risk.  We agreed on a plan how they may be able to reduce their current cardiovascular risk.  For patients with risk calculation greater than 10%, aspirin use was discussed and encouraged unless known allergy or increased risk of bleeding contraindicate use.  15 minutes.        Diagnosis/Plan  1. Encounter for health maintenance examination    2. Vitamin D deficiency  - CBC; Future  - Vitamin D 25-Hydroxy,Total (for eval of Vitamin D levels); Future  - CBC  - Vitamin D 25-Hydroxy,Total (for eval of Vitamin D levels)    3. Dysuria  - Urinalysis with Reflex Microscopic; Future  - Urinalysis with Reflex Microscopic    4. Encounter for therapeutic drug level monitoring  - Benzodiazepine Confirmation, Urine  - Drug Screen, Urine With Reflex to Confirmation    5. Encounter for drug screening  - Benzodiazepine Confirmation, Urine  - Drug Screen, Urine With Reflex to Confirmation    6. Medicare annual wellness visit, subsequent (Primary)  Living Will / Advanced Care Planning: I spent greater than 15 minutes discussing advance care planning including explanation and discussion of advanced directives.  If Patient does not have current up-to-date documents, examples and information were provided on how to create both living will and power of .  Patient was encouraged to work on completing these documents.    7. Abnormal CT of the chest    8. Atherosclerosis of aorta    9. Bruit of right carotid artery    10. Pulmonary emphysema, unspecified emphysema type  (Multi)  - CBC; Future    11. Osteoarthritis, unspecified osteoarthritis type, unspecified site    12. KB (generalized anxiety disorder)  - Benzodiazepine Confirmation, Urine  - ALPRAZolam (Xanax) 0.5 mg tablet; Take 1 tablet (0.5 mg) by mouth 2 times a day as needed for anxiety.  Dispense: 40 tablet; Refill: 2    13. Hypertension, unspecified type  - Comprehensive Metabolic Panel; Future  - TSH with reflex to Free T4 if abnormal; Future  - Comprehensive Metabolic Panel  - Lipid Panel  - TSH with reflex to Free T4 if abnormal  - ECG 12 lead (Clinic Performed)    14. Hyperlipidemia, unspecified hyperlipidemia type  - Comprehensive Metabolic Panel; Future  - Lipid Panel; Future  - TSH with reflex to Free T4 if abnormal; Future  - Comprehensive Metabolic Panel  - Lipid Panel  - TSH with reflex to Free T4 if abnormal  - atorvastatin (Lipitor) 40 mg tablet; Take 1 tablet (40 mg) by mouth once daily.  Dispense: 90 tablet; Refill: 2  - ECG 12 lead (Clinic Performed)    15. Cigarette nicotine dependence with other nicotine-induced disorder  Tobacco use: Patient encouraged to stop smoking.  The patient is aware of the detrimental effects of long-term smoking on overall health and life expectancy.  Call 4-107-QUIT-NOW for additional stop smoking counselling free of charge.    Over the past several years, I have counseled the patient about smoking/tobacco cessation and how I can support efforts when patient is ready to quit.  I have discussed nicotine replacement therapy, Chantix, Wellbutrin, hypnosis, support groups and acupuncture as potential options.  Patient currently has no signs or symptoms of tobacco related disease.  If interested in hypnotism, ask for referral to [facility] or call [facility] in [place] at [phone]  - CT lung screening low dose; Future  - ECG 12 lead (Clinic Performed)    16. Osteoporosis, senile    - alendronate (Fosamax) 70 mg tablet; Take 1 tablet (70 mg) by  mouth every 7 days. Take in the morning with a full glass of water, on an empty stomach, and do not take anything else by mouth or lie down for the next 30 min.  Dispense: 12 tablet; Refill: 2    Continue calcium with vitamin D supplementation, multiple vitamin and weightbearing activity daily.    17. Age-related osteoporosis without current pathological fracture  - XR DEXA bone density; Future    18. Stenosis of right carotid artery  Follow-up with vascular surgeon, Dr. Kimi Easton  - clopidogrel (Plavix) 75 mg tablet; Take 1 tablet (75 mg) by mouth once daily.  Dispense: 30 tablet; Refill: 11  - ECG 12 lead (Clinic Performed)        Follow up in 3 months for Xanax management; 6 months for medical management    I will continue to monitor, evaluate, assess and treat all problems/diagnoses as appropriate and continue to collaborate with specialists.    Contact office or send a  MY Chart message with any questions or concerns    Encouraged to sign up with my  My Chart  Patient will only be notified of labs that require medical intervention.    Prescriptions will not be filled unless you are compliant with your follow up appointments or have a follow up appointment scheduled as per instruction of your physician. Refills should be requested at the time of your visit.    **Charting was completed using voice recognition technology and may include unintended errors**    Caio Broderick DO, YOSELIN  [id number] [id number]  [place] [address] [phone]        Caio Broderick DO, YOSELIN           [1]   Past Medical History:  Diagnosis Date    Actinic keratoses 06/25/2023    Formatting of this note might be different from the original. UPPER CHEST LEFT OF MIDLINE, RIGHT MEDIAL CHEEK    Anxiety 2010    Arthralgia of hip 03/02/2024    Basal cell carcinoma of skin, unspecified     Skin cancer, basal cell    Bunion     COPD (chronic obstructive pulmonary disease) (Multi) 02/10/2023    Eczema 1950    KB (generalized anxiety  disorder) 02/10/2023    Hammer toe     Hyperlipidemia 02/10/2023    Hypertension 02/10/2023    Pneumonia, unspecified organism 10/15/2018    Pneumonia, primary atypical    Protein-calorie malnutrition, unspecified severity (Multi) 2024    Pulmonary arterial hypertension (Multi)     Somatic dysfunction of lumbosacral region 02/10/2023    Squamous cell carcinoma, arm 2023    Formatting of this note might be different from the original. LEFT UPPER ARM    Unintended weight loss 2024   [2]   Past Surgical History:  Procedure Laterality Date    APPENDECTOMY  09/15/2014    Appendectomy    CATARACT EXTRACTION EXTRACAPSULAR W/ INTRAOCULAR LENS IMPLANTATION Bilateral 2023     SECTION, CLASSIC  09/15/2014     Section    COLONOSCOPY  2016    Complete Colonoscopy    CT ANGIO NECK  2022    CT NECK ANGIO W AND WO IV CONTRAST 3/7/2022 [facility]    CT HEAD ANGIO W AND WO IV CONTRAST  2022    CT HEAD ANGIO W AND WO IV CONTRAST 3/7/2022 [facility]    OTHER SURGICAL HISTORY  09/15/2014    Endarterectomy Common Carotid    OTHER SURGICAL HISTORY  2018    Carotid thromboendarterectomy    OTHER SURGICAL HISTORY  2017    Mohs Micrographic Surgery Nose    TUBAL LIGATION     [3]   Family History  Problem Relation Name Age of Onset    Arthritis Mother Gwen     Other (worker's pneumoconiosis) Father Wilmer         's    Hypertension Father Wilmer     Fibromyalgia Sister Kalie     Breast cancer Sister Kalie     Cancer Sister Kalie     Depression Sister Nancy     Kidney disease Daughter Snehal    [4]   Current Outpatient Medications on File Prior to Visit   Medication Sig Dispense Refill    alendronate (Fosamax) 70 mg tablet Take 1 tablet (70 mg) by mouth every 7 days. Take in the morning with a full glass of water, on an empty stomach, and do not take anything else by mouth or lie down for the next 30 min. 12 tablet 2    ALPRAZolam (Xanax) 0.5  mg tablet Take 1 tablet (0.5 mg) by mouth 2 times a day as needed for anxiety. 40 tablet 2    aspirin (Adult Aspirin Regimen) 81 mg EC tablet Take 1 tablet (81 mg) by mouth 1 time.      atorvastatin (Lipitor) 40 mg tablet Take 1 tablet (40 mg) by mouth once daily. 90 tablet 2    calcium carbonate-vitamin D3 500 mg-5 mcg (200 unit) tablet Take 2 tablets by mouth once daily.      clopidogrel (Plavix) 75 mg tablet Take 1 tablet (75 mg) by mouth once daily. 30 tablet 11    multivitamin-min-iron-FA-vit K (Adults Multivitamin) 18 mg iron-400 mcg-25 mcg tablet Take 1 tablet by mouth once daily.      valsartan-hydrochlorothiazide (Diovan-HCT) 160-12.5 mg tablet Take 1 tablet by mouth once daily. 90 tablet 2     No current facility-administered medications on file prior to visit.   [5]   Allergies  Allergen Reactions    Paroxetine Other    Paxil [Paroxetine Hcl] Other     Blurred vision    Zoloft [Sertraline] Blurred vision

## 2025-06-26 ENCOUNTER — TELEPHONE (OUTPATIENT)
Dept: PRIMARY CARE | Facility: CLINIC | Age: 75
End: 2025-06-26
Payer: MEDICARE

## 2025-06-26 DIAGNOSIS — E78.5 HYPERLIPIDEMIA, UNSPECIFIED HYPERLIPIDEMIA TYPE: ICD-10-CM

## 2025-06-26 DIAGNOSIS — R53.83 OTHER FATIGUE: Primary | ICD-10-CM

## 2025-06-26 NOTE — TELEPHONE ENCOUNTER
Lauryn from the lab calling asking for new orders for Pt with the updated code for her Lipid and her Tsh  I will let the lab know when orders are in  [phone]

## 2025-06-26 NOTE — TELEPHONE ENCOUNTER
The lab is stating that there are no orders from May in the system   New orders have to be put in

## 2025-06-27 LAB
1OH-MIDAZOLAM UR-MCNC: NEGATIVE NG/ML
7AMINOCLONAZEPAM UR-MCNC: NEGATIVE NG/ML
A-OH ALPRAZ UR-MCNC: 149 NG/ML
A-OH-TRIAZOLAM UR-MCNC: NEGATIVE NG/ML
AMPHETAMINES UR QL: NEGATIVE NG/ML
BARBITURATES UR QL: NEGATIVE NG/ML
BENZODIAZ UR QL: POSITIVE NG/ML
BZE UR QL: NEGATIVE NG/ML
CREAT UR-MCNC: 90 MG/DL
DRUG SCREEN COMMENT UR-IMP: ABNORMAL
FENTANYL UR QL SCN: NEGATIVE NG/ML
LORAZEPAM UR-MCNC: NEGATIVE NG/ML
METHADONE UR QL: NEGATIVE NG/ML
NORDIAZEPAM UR-MCNC: NEGATIVE NG/ML
OH-ETHYLFLURAZ UR-MCNC: NEGATIVE NG/ML
OPIATES UR QL: NEGATIVE NG/ML
OXAZEPAM UR-MCNC: NEGATIVE NG/ML
OXIDANTS UR QL: NEGATIVE MCG/ML
OXYCODONE UR QL: NEGATIVE NG/ML
PCP UR QL: NEGATIVE NG/ML
PH UR: 7 [PH] (ref 4.5–9)
QUEST NOTES AND COMMENTS: ABNORMAL
TEMAZEPAM UR-MCNC: NEGATIVE NG/ML
THC UR QL: NEGATIVE NG/ML

## 2025-07-01 LAB
CHOLEST SERPL-MCNC: 118 MG/DL
CHOLEST/HDLC SERPL: 3.1 (CALC)
HDLC SERPL-MCNC: 38 MG/DL
LDLC SERPL CALC-MCNC: 64 MG/DL (CALC)
NONHDLC SERPL-MCNC: 80 MG/DL (CALC)
TRIGL SERPL-MCNC: 78 MG/DL
TSH SERPL-ACNC: 0.63 MIU/L (ref 0.4–4.5)

## 2025-07-08 ENCOUNTER — HOSPITAL ENCOUNTER (OUTPATIENT)
Dept: RADIOLOGY | Facility: CLINIC | Age: 75
Discharge: HOME | End: 2025-07-08
Payer: MEDICARE

## 2025-07-08 DIAGNOSIS — M81.0 AGE-RELATED OSTEOPOROSIS WITHOUT CURRENT PATHOLOGICAL FRACTURE: ICD-10-CM

## 2025-07-08 PROCEDURE — 77080 DXA BONE DENSITY AXIAL: CPT | Performed by: STUDENT IN AN ORGANIZED HEALTH CARE EDUCATION/TRAINING PROGRAM

## 2025-07-08 PROCEDURE — 77080 DXA BONE DENSITY AXIAL: CPT

## 2025-09-15 ENCOUNTER — APPOINTMENT (OUTPATIENT)
Dept: PRIMARY CARE | Facility: CLINIC | Age: 75
End: 2025-09-15
Payer: COMMERCIAL

## 2025-09-25 ENCOUNTER — APPOINTMENT (OUTPATIENT)
Dept: PODIATRY | Facility: CLINIC | Age: 75
End: 2025-09-25
Payer: MEDICARE

## 2025-12-16 ENCOUNTER — APPOINTMENT (OUTPATIENT)
Dept: PRIMARY CARE | Facility: CLINIC | Age: 75
End: 2025-12-16
Payer: COMMERCIAL

## 2026-04-15 ENCOUNTER — APPOINTMENT (OUTPATIENT)
Dept: VASCULAR SURGERY | Facility: CLINIC | Age: 76
End: 2026-04-15
Payer: MEDICARE